# Patient Record
Sex: MALE | Race: WHITE | NOT HISPANIC OR LATINO | Employment: OTHER | URBAN - METROPOLITAN AREA
[De-identification: names, ages, dates, MRNs, and addresses within clinical notes are randomized per-mention and may not be internally consistent; named-entity substitution may affect disease eponyms.]

---

## 2017-02-11 ENCOUNTER — OFFICE VISIT - HEALTHEAST (OUTPATIENT)
Dept: FAMILY MEDICINE | Facility: CLINIC | Age: 47
End: 2017-02-11

## 2017-02-11 DIAGNOSIS — J01.00 ACUTE MAXILLARY SINUSITIS: ICD-10-CM

## 2017-02-11 DIAGNOSIS — H60.93 BILATERAL OTITIS EXTERNA: ICD-10-CM

## 2017-02-21 ENCOUNTER — COMMUNICATION - HEALTHEAST (OUTPATIENT)
Dept: FAMILY MEDICINE | Facility: CLINIC | Age: 47
End: 2017-02-21

## 2017-02-21 ENCOUNTER — AMBULATORY - HEALTHEAST (OUTPATIENT)
Dept: NURSING | Facility: CLINIC | Age: 47
End: 2017-02-21

## 2017-02-22 ENCOUNTER — OFFICE VISIT - HEALTHEAST (OUTPATIENT)
Dept: FAMILY MEDICINE | Facility: CLINIC | Age: 47
End: 2017-02-22

## 2017-02-22 DIAGNOSIS — I10 ESSENTIAL HYPERTENSION: ICD-10-CM

## 2017-02-22 DIAGNOSIS — Z00.00 HEALTH CARE MAINTENANCE: ICD-10-CM

## 2017-02-22 DIAGNOSIS — E66.01 MORBID OBESITY, UNSPECIFIED OBESITY TYPE (H): ICD-10-CM

## 2017-03-08 ENCOUNTER — AMBULATORY - HEALTHEAST (OUTPATIENT)
Dept: FAMILY MEDICINE | Facility: CLINIC | Age: 47
End: 2017-03-08

## 2017-03-08 ENCOUNTER — AMBULATORY - HEALTHEAST (OUTPATIENT)
Dept: LAB | Facility: CLINIC | Age: 47
End: 2017-03-08

## 2017-03-08 ENCOUNTER — AMBULATORY - HEALTHEAST (OUTPATIENT)
Dept: NURSING | Facility: CLINIC | Age: 47
End: 2017-03-08

## 2017-03-08 ENCOUNTER — COMMUNICATION - HEALTHEAST (OUTPATIENT)
Dept: FAMILY MEDICINE | Facility: CLINIC | Age: 47
End: 2017-03-08

## 2017-03-08 DIAGNOSIS — I10 HTN (HYPERTENSION): ICD-10-CM

## 2017-03-08 DIAGNOSIS — Z00.00 HEALTH CARE MAINTENANCE: ICD-10-CM

## 2017-03-08 DIAGNOSIS — I10 ESSENTIAL HYPERTENSION: ICD-10-CM

## 2017-03-08 LAB
CHOLEST SERPL-MCNC: 213 MG/DL
FASTING STATUS PATIENT QL REPORTED: YES
HDLC SERPL-MCNC: 34 MG/DL
LDLC SERPL CALC-MCNC: 137 MG/DL
TRIGL SERPL-MCNC: 209 MG/DL

## 2019-03-15 ENCOUNTER — OFFICE VISIT - HEALTHEAST (OUTPATIENT)
Dept: FAMILY MEDICINE | Facility: CLINIC | Age: 49
End: 2019-03-15

## 2019-03-15 DIAGNOSIS — J40 BRONCHITIS: ICD-10-CM

## 2019-03-20 ENCOUNTER — COMMUNICATION - HEALTHEAST (OUTPATIENT)
Dept: FAMILY MEDICINE | Facility: CLINIC | Age: 49
End: 2019-03-20

## 2019-03-21 ENCOUNTER — COMMUNICATION - HEALTHEAST (OUTPATIENT)
Dept: FAMILY MEDICINE | Facility: CLINIC | Age: 49
End: 2019-03-21

## 2019-03-25 ENCOUNTER — OFFICE VISIT - HEALTHEAST (OUTPATIENT)
Dept: FAMILY MEDICINE | Facility: CLINIC | Age: 49
End: 2019-03-25

## 2019-03-25 DIAGNOSIS — R07.89 CHEST WALL PAIN: ICD-10-CM

## 2019-03-25 DIAGNOSIS — E66.01 MORBID OBESITY (H): ICD-10-CM

## 2019-03-25 DIAGNOSIS — J30.9 ALLERGIC RHINITIS, UNSPECIFIED SEASONALITY, UNSPECIFIED TRIGGER: ICD-10-CM

## 2019-03-25 DIAGNOSIS — R73.03 PREDIABETES: ICD-10-CM

## 2019-03-25 DIAGNOSIS — I10 ESSENTIAL HYPERTENSION: ICD-10-CM

## 2019-03-25 DIAGNOSIS — R74.01 ELEVATED ALT MEASUREMENT: ICD-10-CM

## 2019-03-25 DIAGNOSIS — R07.89 ATYPICAL CHEST PAIN: ICD-10-CM

## 2019-03-25 LAB
ALBUMIN SERPL-MCNC: 4.3 G/DL (ref 3.5–5)
ALP SERPL-CCNC: 81 U/L (ref 45–120)
ALT SERPL W P-5'-P-CCNC: 61 U/L (ref 0–45)
ANION GAP SERPL CALCULATED.3IONS-SCNC: 10 MMOL/L (ref 5–18)
AST SERPL W P-5'-P-CCNC: 32 U/L (ref 0–40)
ATRIAL RATE - MUSE: 87 BPM
BILIRUB SERPL-MCNC: 0.4 MG/DL (ref 0–1)
BUN SERPL-MCNC: 14 MG/DL (ref 8–22)
CALCIUM SERPL-MCNC: 9.7 MG/DL (ref 8.5–10.5)
CHLORIDE BLD-SCNC: 104 MMOL/L (ref 98–107)
CO2 SERPL-SCNC: 25 MMOL/L (ref 22–31)
CREAT SERPL-MCNC: 0.86 MG/DL (ref 0.7–1.3)
DIASTOLIC BLOOD PRESSURE - MUSE: NORMAL MMHG
GFR SERPL CREATININE-BSD FRML MDRD: >60 ML/MIN/1.73M2
GLUCOSE BLD-MCNC: 91 MG/DL (ref 70–125)
HBA1C MFR BLD: 6.3 % (ref 3.5–6)
INTERPRETATION ECG - MUSE: NORMAL
P AXIS - MUSE: 53 DEGREES
POTASSIUM BLD-SCNC: 4.5 MMOL/L (ref 3.5–5)
PR INTERVAL - MUSE: 130 MS
PROT SERPL-MCNC: 7.4 G/DL (ref 6–8)
QRS DURATION - MUSE: 90 MS
QT - MUSE: 368 MS
QTC - MUSE: 442 MS
R AXIS - MUSE: 70 DEGREES
SODIUM SERPL-SCNC: 139 MMOL/L (ref 136–145)
SYSTOLIC BLOOD PRESSURE - MUSE: NORMAL MMHG
T AXIS - MUSE: 36 DEGREES
VENTRICULAR RATE- MUSE: 87 BPM

## 2019-03-25 ASSESSMENT — MIFFLIN-ST. JEOR: SCORE: 2277.4

## 2019-03-26 ENCOUNTER — COMMUNICATION - HEALTHEAST (OUTPATIENT)
Dept: FAMILY MEDICINE | Facility: CLINIC | Age: 49
End: 2019-03-26

## 2019-04-22 ENCOUNTER — COMMUNICATION - HEALTHEAST (OUTPATIENT)
Dept: FAMILY MEDICINE | Facility: CLINIC | Age: 49
End: 2019-04-22

## 2019-08-15 ENCOUNTER — COMMUNICATION - HEALTHEAST (OUTPATIENT)
Dept: FAMILY MEDICINE | Facility: CLINIC | Age: 49
End: 2019-08-15

## 2021-03-02 ENCOUNTER — OFFICE VISIT - HEALTHEAST (OUTPATIENT)
Dept: FAMILY MEDICINE | Facility: CLINIC | Age: 51
End: 2021-03-02

## 2021-03-02 DIAGNOSIS — Z00.00 HEALTH CARE MAINTENANCE: ICD-10-CM

## 2021-03-02 DIAGNOSIS — I10 ESSENTIAL HYPERTENSION: ICD-10-CM

## 2021-03-04 ENCOUNTER — COMMUNICATION - HEALTHEAST (OUTPATIENT)
Dept: FAMILY MEDICINE | Facility: CLINIC | Age: 51
End: 2021-03-04

## 2021-03-04 ENCOUNTER — OFFICE VISIT - HEALTHEAST (OUTPATIENT)
Dept: FAMILY MEDICINE | Facility: CLINIC | Age: 51
End: 2021-03-04

## 2021-03-04 DIAGNOSIS — I10 ESSENTIAL HYPERTENSION: ICD-10-CM

## 2021-03-04 DIAGNOSIS — E11.9 TYPE 2 DIABETES MELLITUS WITHOUT COMPLICATION, WITHOUT LONG-TERM CURRENT USE OF INSULIN (H): ICD-10-CM

## 2021-03-04 DIAGNOSIS — R06.01 ORTHOPNEA: ICD-10-CM

## 2021-03-04 DIAGNOSIS — E66.813 CLASS 3 SEVERE OBESITY DUE TO EXCESS CALORIES WITHOUT SERIOUS COMORBIDITY WITH BODY MASS INDEX (BMI) OF 45.0 TO 49.9 IN ADULT (H): ICD-10-CM

## 2021-03-04 DIAGNOSIS — Z13.220 LIPID SCREENING: ICD-10-CM

## 2021-03-04 DIAGNOSIS — E66.01 CLASS 3 SEVERE OBESITY DUE TO EXCESS CALORIES WITHOUT SERIOUS COMORBIDITY WITH BODY MASS INDEX (BMI) OF 45.0 TO 49.9 IN ADULT (H): ICD-10-CM

## 2021-03-04 DIAGNOSIS — Z00.00 HEALTH CARE MAINTENANCE: ICD-10-CM

## 2021-03-04 LAB
ALBUMIN SERPL-MCNC: 4.1 G/DL (ref 3.5–5)
ALP SERPL-CCNC: 103 U/L (ref 45–120)
ALT SERPL W P-5'-P-CCNC: 41 U/L (ref 0–45)
ANION GAP SERPL CALCULATED.3IONS-SCNC: 11 MMOL/L (ref 5–18)
AST SERPL W P-5'-P-CCNC: 21 U/L (ref 0–40)
BILIRUB SERPL-MCNC: 0.5 MG/DL (ref 0–1)
BNP SERPL-MCNC: <10 PG/ML (ref 0–39)
BUN SERPL-MCNC: 14 MG/DL (ref 8–22)
CALCIUM SERPL-MCNC: 9 MG/DL (ref 8.5–10.5)
CHLORIDE BLD-SCNC: 101 MMOL/L (ref 98–107)
CHOLEST SERPL-MCNC: 228 MG/DL
CO2 SERPL-SCNC: 27 MMOL/L (ref 22–31)
CREAT SERPL-MCNC: 0.81 MG/DL (ref 0.7–1.3)
ERYTHROCYTE [DISTWIDTH] IN BLOOD BY AUTOMATED COUNT: 12.1 % (ref 11–14.5)
FASTING STATUS PATIENT QL REPORTED: YES
GFR SERPL CREATININE-BSD FRML MDRD: >60 ML/MIN/1.73M2
GLUCOSE BLD-MCNC: 139 MG/DL (ref 70–125)
HBA1C MFR BLD: 7.4 %
HCT VFR BLD AUTO: 47 % (ref 40–54)
HDLC SERPL-MCNC: 38 MG/DL
HGB BLD-MCNC: 15.9 G/DL (ref 14–18)
LDLC SERPL CALC-MCNC: 132 MG/DL
MCH RBC QN AUTO: 31.4 PG (ref 27–34)
MCHC RBC AUTO-ENTMCNC: 33.8 G/DL (ref 32–36)
MCV RBC AUTO: 93 FL (ref 80–100)
PLATELET # BLD AUTO: 232 THOU/UL (ref 140–440)
PMV BLD AUTO: 11.9 FL (ref 7–10)
POTASSIUM BLD-SCNC: 4.7 MMOL/L (ref 3.5–5)
PROT SERPL-MCNC: 7.2 G/DL (ref 6–8)
PSA SERPL-MCNC: 0.5 NG/ML (ref 0–2.5)
RBC # BLD AUTO: 5.07 MILL/UL (ref 4.4–6.2)
SODIUM SERPL-SCNC: 139 MMOL/L (ref 136–145)
TRIGL SERPL-MCNC: 291 MG/DL
WBC: 9.5 THOU/UL (ref 4–11)

## 2021-03-04 ASSESSMENT — MIFFLIN-ST. JEOR: SCORE: 2358.58

## 2021-03-05 RX ORDER — ATORVASTATIN CALCIUM 20 MG/1
20 TABLET, FILM COATED ORAL DAILY
Qty: 90 TABLET | Refills: 3 | Status: SHIPPED | OUTPATIENT
Start: 2021-03-05 | End: 2023-06-22

## 2021-04-05 ENCOUNTER — OFFICE VISIT - HEALTHEAST (OUTPATIENT)
Dept: FAMILY MEDICINE | Facility: CLINIC | Age: 51
End: 2021-04-05

## 2021-04-05 DIAGNOSIS — E66.01 CLASS 3 SEVERE OBESITY DUE TO EXCESS CALORIES WITHOUT SERIOUS COMORBIDITY WITH BODY MASS INDEX (BMI) OF 40.0 TO 44.9 IN ADULT (H): ICD-10-CM

## 2021-04-05 DIAGNOSIS — I10 ESSENTIAL HYPERTENSION: ICD-10-CM

## 2021-04-05 DIAGNOSIS — E66.813 CLASS 3 SEVERE OBESITY DUE TO EXCESS CALORIES WITHOUT SERIOUS COMORBIDITY WITH BODY MASS INDEX (BMI) OF 40.0 TO 44.9 IN ADULT (H): ICD-10-CM

## 2021-04-05 DIAGNOSIS — R80.9 TYPE 2 DIABETES MELLITUS WITH MICROALBUMINURIA, WITHOUT LONG-TERM CURRENT USE OF INSULIN (H): ICD-10-CM

## 2021-04-05 DIAGNOSIS — E11.29 TYPE 2 DIABETES MELLITUS WITH MICROALBUMINURIA, WITHOUT LONG-TERM CURRENT USE OF INSULIN (H): ICD-10-CM

## 2021-04-05 LAB
ANION GAP SERPL CALCULATED.3IONS-SCNC: 10 MMOL/L (ref 5–18)
BUN SERPL-MCNC: 11 MG/DL (ref 8–22)
CALCIUM SERPL-MCNC: 8.5 MG/DL (ref 8.5–10.5)
CHLORIDE BLD-SCNC: 104 MMOL/L (ref 98–107)
CO2 SERPL-SCNC: 24 MMOL/L (ref 22–31)
CREAT SERPL-MCNC: 0.76 MG/DL (ref 0.7–1.3)
CREAT UR-MCNC: 138.4 MG/DL
GFR SERPL CREATININE-BSD FRML MDRD: >60 ML/MIN/1.73M2
GLUCOSE BLD-MCNC: 122 MG/DL (ref 70–125)
MICROALBUMIN UR-MCNC: 6.83 MG/DL (ref 0–1.99)
MICROALBUMIN/CREAT UR: 49.3 MG/G
POTASSIUM BLD-SCNC: 4.4 MMOL/L (ref 3.5–5)
SODIUM SERPL-SCNC: 138 MMOL/L (ref 136–145)

## 2021-04-05 ASSESSMENT — MIFFLIN-ST. JEOR: SCORE: 2281.47

## 2021-04-27 ENCOUNTER — COMMUNICATION - HEALTHEAST (OUTPATIENT)
Dept: FAMILY MEDICINE | Facility: CLINIC | Age: 51
End: 2021-04-27

## 2021-04-27 DIAGNOSIS — I10 ESSENTIAL HYPERTENSION: ICD-10-CM

## 2021-05-01 ENCOUNTER — COMMUNICATION - HEALTHEAST (OUTPATIENT)
Dept: FAMILY MEDICINE | Facility: CLINIC | Age: 51
End: 2021-05-01

## 2021-05-01 DIAGNOSIS — I10 ESSENTIAL HYPERTENSION: ICD-10-CM

## 2021-05-03 ENCOUNTER — RECORDS - HEALTHEAST (OUTPATIENT)
Dept: FAMILY MEDICINE | Facility: CLINIC | Age: 51
End: 2021-05-03

## 2021-05-03 DIAGNOSIS — I10 ESSENTIAL HYPERTENSION: ICD-10-CM

## 2021-05-03 RX ORDER — LOSARTAN POTASSIUM 25 MG/1
25 TABLET ORAL DAILY
Qty: 90 TABLET | Refills: 1 | Status: SHIPPED | OUTPATIENT
Start: 2021-05-03 | End: 2022-01-18

## 2021-05-20 ENCOUNTER — COMMUNICATION - HEALTHEAST (OUTPATIENT)
Dept: FAMILY MEDICINE | Facility: CLINIC | Age: 51
End: 2021-05-20

## 2021-05-20 DIAGNOSIS — R80.9 TYPE 2 DIABETES MELLITUS WITH MICROALBUMINURIA, WITHOUT LONG-TERM CURRENT USE OF INSULIN (H): ICD-10-CM

## 2021-05-20 DIAGNOSIS — E11.29 TYPE 2 DIABETES MELLITUS WITH MICROALBUMINURIA, WITHOUT LONG-TERM CURRENT USE OF INSULIN (H): ICD-10-CM

## 2021-05-27 NOTE — PATIENT INSTRUCTIONS - HE
BP Readings from Last 7 Encounters:   03/25/19 (!) 162/112   03/15/19 (!) 157/103   03/08/17 (!) 120/94   02/22/17 132/88   02/21/17 (!) 150/110   02/11/17 142/80   10/06/16 (!) 148/96     Start taking your blood pressure medication.  I am starting you back on a combination of losartan and hydrochlorothiazide.  Work on losing weight.    For phlegm and seasonal allergy, start using Flonase and Claritin daily until you feel your nasal congestion is improving.    Wt Readings from Last 7 Encounters:   03/25/19 (!) 310 lb (140.6 kg)   03/15/19 (!) 310 lb 8 oz (140.8 kg)   02/22/17 (!) 281 lb (127.5 kg)   02/11/17 (!) 283 lb 4.8 oz (128.5 kg)   10/06/16 (!) 272 lb (123.4 kg)   10/03/16 (!) 273 lb 0.6 oz (123.9 kg)   03/05/16 (!) 277 lb 1.6 oz (125.7 kg)

## 2021-05-27 NOTE — TELEPHONE ENCOUNTER
Reason contacted:  Test results  Information relayed:  Informed Pt of Dr Moody's message and recommendations. Pt states understanding and will attempt dietary modification and exercise. Will schedule a visit for 3 months. Will mail copy of results to  address.  Additional questions:  No  Further follow-up needed:  No  Okay to leave a detailed message:  No

## 2021-05-27 NOTE — PROGRESS NOTES
Assessment:     1. Atypical chest pain  Electrocardiogram Perform and Read    cyclobenzaprine (FLEXERIL) 5 MG tablet   2. Essential hypertension  Comprehensive Metabolic Panel    Glycosylated Hemoglobin A1c    losartan-hydrochlorothiazide (HYZAAR) 50-12.5 mg per tablet   3. Morbid obesity (H)  Comprehensive Metabolic Panel    Glycosylated Hemoglobin A1c   4. Chest wall pain     5. Allergic rhinitis, unspecified seasonality, unspecified trigger  fluticasone (FLONASE) 50 mcg/actuation nasal spray    loratadine (CLARITIN) 10 mg tablet   6. Elevated ALT measurement     7. Prediabetes     X-ray done at walk-in clinic is reviewed and is normal.  Reviewed visit at walk-in clinic although the note is still incomplete.  However noted that he was treated with antibiotic and a prednisone pack and albuterol.    EKG read by me does not show any acute changes.     Since patient is not on any medication for hypertension, I emphasized that he needs to take and we will start with a combination pill.  He is agreeable baseline labs done today.    His chest pain appears to be chest wall pain and we will treat with muscle relaxer.  Warning signs and symptoms discussed with her.    DATA REVIEWED:  Additional History from Old Records Summarized (2): 2  Decision to Obtain Records (1): 0  Radiology Tests Summarized or Ordered (1): 1  Labs Reviewed or Ordered (1): 1  Medicine Test Summarized or Ordered (1): 0  Independent Review of EKG or X-RAY(2 each):2       Plan:      The diagnosis was discussed with the patient and evaluation and treatment plans outlined.  See orders for lab and imaging studies.     Start taking your blood pressure medication.  I am starting you back on a combination of losartan and hydrochlorothiazide.  Work on losing weight.    For phlegm and seasonal allergy, start using Flonase and Claritin daily until you feel your nasal congestion is improving.    Subjective:      Peter Arora is a  male who presents for  evaluation of   Chief Complaint   Patient presents with     Chest Pain     X-ray done 3/15/2019, problems with breathing and flem in the back of the throat, non stop coughing, now having pain on the left side of the ribs through the back.      Onset was 1 month ago. Symptoms have been stable.   He describes that he has always had allergy.    He describes that he is always had wheezing off and on.  He does not take any medications.  He does not like any allergy medication.    Over the last 1 month his cough has been so bad that his left side of the lower chest is hurting.    He describes that he is going on a fishing trip tomorrow and would like something for pain control.      He was seen at the urgent care and x-ray was done.    X-rays reviewed and this is normal.  He was dispensed with albuterol inhaler that he has been using as needed.  With some relief.    He was also treated with antibiotic and prednisone burst.    The pain is described as sharp and shooting, and is 8/10 in intensity. Pain is located in the right lower ribs with radiation to back.  Aggravating factors: activity and coughing.  Alleviating factors: recumbency. Associated symptoms: none. The patient denies anorexia, arthralagias, fever and vomiting.    He has essential hypertension and is on 3 medication.  However, he informs me that he does not take any medications as he does not feel the need for it.  He has no symptoms related to his blood pressure.  He denies shortness of breath, palpitations, left-sided chest pain or Declining energy or swelling of the lower extremities.  The following portions of the patient's history were reviewed and updated as appropriate: allergies, current medications, past family history, past medical history, past social history, past surgical history and problem list.  Allergies   Allergen Reactions     Lisinopril      Cough         Current Outpatient Medications on File Prior to Visit   Medication Sig Dispense  Refill     albuterol (PROAIR HFA;PROVENTIL HFA;VENTOLIN HFA) 90 mcg/actuation inhaler Inhale 2 puffs every 4 (four) hours as needed for wheezing or shortness of breath. 1 each 0     No current facility-administered medications on file prior to visit.        Patient Active Problem List   Diagnosis     Obesity     Abnormal Weight Gain     Peripheral Neuropathy     Hoarseness     Cough     Benign Essential Hypertension     Carpal Tunnel Syndrome     Allergies     Morbid obesity (H)     Prediabetes     Elevated ALT measurement       Past Medical History:   Diagnosis Date     Allergies     Created by Conversion      Benign Essential Hypertension     Created by Conversion      Carpal tunnel syndrome     Created by Conversion      Obesity     Created by Conversion      Peripheral Neuropathy     Created by Conversion  Replacement Utility updated for latest IMO load       History reviewed. No pertinent surgical history.    Family History   Problem Relation Age of Onset     Diabetes Brother        Social History     Socioeconomic History     Marital status: Single     Spouse name: None     Number of children: None     Years of education: None     Highest education level: None   Occupational History     None   Social Needs     Financial resource strain: None     Food insecurity:     Worry: None     Inability: None     Transportation needs:     Medical: None     Non-medical: None   Tobacco Use     Smoking status: Never Smoker     Smokeless tobacco: Never Used   Substance and Sexual Activity     Alcohol use: None     Drug use: None     Sexual activity: None   Lifestyle     Physical activity:     Days per week: None     Minutes per session: None     Stress: None   Relationships     Social connections:     Talks on phone: None     Gets together: None     Attends Synagogue service: None     Active member of club or organization: None     Attends meetings of clubs or organizations: None     Relationship status: None     Intimate  "partner violence:     Fear of current or ex partner: None     Emotionally abused: None     Physically abused: None     Forced sexual activity: None   Other Topics Concern     None   Social History Narrative     None       Review of Systems  A 12 point comprehensive review of systems was negative except as noted.       Objective:   BP (!) 138/101 (Patient Site: Left Arm, Patient Position: Sitting, Cuff Size: Adult Large)   Pulse 95   Resp 24   Ht 5' 10\" (1.778 m)   Wt (!) 310 lb (140.6 kg)   SpO2 95%   BMI 44.48 kg/m      General Appearance: healthy, alert, oriented and no distress  HEENT Exam: Oropharynx clear without lesion or exudate.  Nasal turbinates are erythematous and hypertrophied.  Mild oropharyngeal erythema and postnasal drip seen  Neck:  supple, no adenopathy, thyroid normal  Heart: Normal heart sounds, S1 and S2, No murmurs.  Lungs: Normal breath sounds, good air entry with intermittent wheezing noted.  Skin exam: No visible or palpable abnormalities  Neurological exam: gait normal, alert and oriented X 3  Hem/Lymph/Immuno exam: negative findings:  no cervical nodes, no supraclavicular nodes, no axillary nodes      "

## 2021-05-27 NOTE — TELEPHONE ENCOUNTER
----- Message from Thania Moody MD sent at 3/25/2019  3:38 PM CDT -----  Please inform patient that the test for screening diabetes is borderline.  Hemoglobin A1c is 6.3.  This indicates prediabetes (hemoglobin A1c between 5.7 and 6.4 is prediabetes and 6.5 or greater is Diabetes).  I recommend dietary modification and exercise and a repeat check in 3 months time.  A nutritional consult can also be considered.  If no improvement then we can consider starting on some medications.  Thania Moody MD  3/25/2019  P.S-I have sent  my chart message.

## 2021-05-30 VITALS — BODY MASS INDEX: 40.65 KG/M2 | WEIGHT: 283.3 LBS

## 2021-05-30 VITALS — WEIGHT: 281 LBS | BODY MASS INDEX: 40.32 KG/M2

## 2021-06-01 ENCOUNTER — RECORDS - HEALTHEAST (OUTPATIENT)
Dept: ADMINISTRATIVE | Facility: CLINIC | Age: 51
End: 2021-06-01

## 2021-06-02 VITALS — WEIGHT: 310.5 LBS | BODY MASS INDEX: 44.55 KG/M2

## 2021-06-02 VITALS — HEIGHT: 70 IN | BODY MASS INDEX: 44.38 KG/M2 | WEIGHT: 310 LBS

## 2021-06-03 ENCOUNTER — RECORDS - HEALTHEAST (OUTPATIENT)
Dept: ADMINISTRATIVE | Facility: CLINIC | Age: 51
End: 2021-06-03

## 2021-06-05 VITALS
HEART RATE: 86 BPM | SYSTOLIC BLOOD PRESSURE: 157 MMHG | DIASTOLIC BLOOD PRESSURE: 104 MMHG | OXYGEN SATURATION: 98 % | HEIGHT: 70 IN | BODY MASS INDEX: 45.1 KG/M2 | WEIGHT: 315 LBS | TEMPERATURE: 97.5 F

## 2021-06-05 VITALS
TEMPERATURE: 97.3 F | BODY MASS INDEX: 44.67 KG/M2 | HEART RATE: 73 BPM | HEIGHT: 70 IN | WEIGHT: 312 LBS | DIASTOLIC BLOOD PRESSURE: 90 MMHG | SYSTOLIC BLOOD PRESSURE: 141 MMHG

## 2021-06-07 ENCOUNTER — COMMUNICATION - HEALTHEAST (OUTPATIENT)
Dept: FAMILY MEDICINE | Facility: CLINIC | Age: 51
End: 2021-06-07

## 2021-06-07 DIAGNOSIS — R80.9 TYPE 2 DIABETES MELLITUS WITH MICROALBUMINURIA, WITHOUT LONG-TERM CURRENT USE OF INSULIN (H): ICD-10-CM

## 2021-06-07 DIAGNOSIS — E11.29 TYPE 2 DIABETES MELLITUS WITH MICROALBUMINURIA, WITHOUT LONG-TERM CURRENT USE OF INSULIN (H): ICD-10-CM

## 2021-06-08 ENCOUNTER — COMMUNICATION - HEALTHEAST (OUTPATIENT)
Dept: FAMILY MEDICINE | Facility: CLINIC | Age: 51
End: 2021-06-08

## 2021-06-08 DIAGNOSIS — E11.29 TYPE 2 DIABETES MELLITUS WITH MICROALBUMINURIA, WITHOUT LONG-TERM CURRENT USE OF INSULIN (H): ICD-10-CM

## 2021-06-08 DIAGNOSIS — R80.9 TYPE 2 DIABETES MELLITUS WITH MICROALBUMINURIA, WITHOUT LONG-TERM CURRENT USE OF INSULIN (H): ICD-10-CM

## 2021-06-08 NOTE — PROGRESS NOTES
Chief Complaint   Patient presents with     Facial Pain     teeth pain, ringing in ears, cough, pressure. Started 1 wk ago, getting worse, not sleeping.        HPI:    Patient is here for over a week of green, thick nasal discharge with congestion, and facial pain, with a mild cough, and bilateral ear pain. No fever, chills, chest pain, shortness of breath. He took OTC meds without relief.     ROS: Pertinent ROS noted in HPI.     Allergies   Allergen Reactions     Lisinopril      Cough         Patient Active Problem List   Diagnosis     Obesity     Abnormal Weight Gain     Peripheral Neuropathy     Hoarseness     Cough     Benign Essential Hypertension     Carpal Tunnel Syndrome     Allergies       No family history on file.    Social History     Social History     Marital status: Single     Spouse name: N/A     Number of children: N/A     Years of education: N/A     Occupational History     Not on file.     Social History Main Topics     Smoking status: Never Smoker     Smokeless tobacco: Never Used     Alcohol use Not on file     Drug use: Not on file     Sexual activity: Not on file     Other Topics Concern     Not on file     Social History Narrative     Objective:    Vitals:    02/11/17 1137   BP: 142/80   Pulse: (!) 105   Resp: 15   Temp: 98.3  F (36.8  C)   SpO2: 96%       Gen:NAD  Throat: normal  Ears: normal TMs bilaterally, ear canals erythematous and inflamed bilaterally with tenderness upon manipulation of bilateral external ears  Nose: congested and inflamed nasal mucosa bilaterally  Sinus: tenderness to percussion of maxillary sinuses bilaterally  Neck: no adenopathy  CV: RRR, no M, R, G  Pulm: CTAB, normal effort      Acute maxillary sinusitis  -     amoxicillin (AMOXIL) 875 MG tablet; Take 1 tablet (875 mg total) by mouth 2 (two) times a day for 10 days.    Bilateral otitis externa  -     ofloxacin (FLOXIN) 0.3 % otic solution; Administer 10 drops once daily to each ear for 7 days.      Supportive  cares, and follow up as directed.

## 2021-06-09 RX ORDER — METFORMIN HCL 500 MG
TABLET, EXTENDED RELEASE 24 HR ORAL
Qty: 360 TABLET | Refills: 1 | Status: SHIPPED | OUTPATIENT
Start: 2021-06-09 | End: 2021-12-22

## 2021-06-09 NOTE — PROGRESS NOTES
Pt here for nurse BP check. Pt stated he has been taking the HCTZ and has cut sodium out of his diet. Pt stated he has been feeling better and no longer having dizziness.        BP:128/88 P:88  Recheck BP: 120/94    See BP and OV notes below from 2/22/17    BP: 134/88  Recheck: 132/88    ASSESSMENT & PLAN:     1. 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 Dispense: 90 tablet; Refill: 0  - Basic Metabolic Panel; Future  - Lipid [place] FASTING; Future  - HM2(CBC w/o Differential); Future

## 2021-06-09 NOTE — PROGRESS NOTES
ASSESSMENT & PLAN:    1. 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  Dispense: 90 tablet; Refill: 0  - Basic Metabolic Panel; Future  - Lipid [place] FASTING; Future  - HM2(CBC w/o Differential); Future    2. Health care maintenance  We will return in the near future fasting labs  Discussed with him the importance of checking these on a regular basis since he is on medications  Have not seen the patient roughly 2 years and we need to be seen on a more frequent basis  - Basic Metabolic Panel; Future  - Lipid [place] FASTING; Future  - HM2(CBC w/o Differential); Future    3. Morbid obesity, unspecified obesity type  Patient is aware there is been gaining weight and needs to lose weight  BMI is not calculated him in morbid obesity range  He is going to try to start eating better and we discussed the need for more cardiovascular exercise      There are no Patient Instructions on file for this visit.    Orders Placed This Encounter   Procedures     Basic Metabolic Panel     Standing Status:   Future     Standing Expiration Date:   2/22/2018     Lipid [place] FASTING     Standing Status:   Future     Standing Expiration Date:   2/22/2018     Order Specific Question:   Fasting is required?     Answer:   Yes     HM2(CBC w/o Differential)     Standing Status:   Future     Standing Expiration Date:   2/22/2018     Medications Discontinued During  This Encounter   Medication Reason     hydrochlorothiazide (HYDRODIURIL) 25 MG tablet Therapy completed       No Follow-up on file.    CHIEF COMPLAINT:  Chief Complaint   Patient presents with     Hypertension     Medication check        HISTORY OF PRESENT ILLNESS:  Peter is a 46 y.o. male presenting to the clinic today for a medication check. His blood pressure was elevated at his recent DOT physical, and he did not pass. He has put on some weight and is afraid that his blood pressure has been worse as a result. He continues to take 100 mg of losartan daily, but he does not recall when he discontinued hydrochlorothiazide. He developed a cough from lisinopril. He takes all of his medications at night. He tries to watch his sodium intake, but it was discussed that eating out does not allow for this.     REVIEW OF SYSTEMS:   He has gained some weight since the last time he was seen. He has not done any lab work in a while. He is recently getting over a cold. All other systems are negative.    PFSH:  He drives semi for work. He rebuilt his house, but he has not had a kitchen for six months, so he has been eating a lot of fast food. He does not smoke or drink alcohol. He used to drink a half gallon of milk daily, but he has discontinued this habit.     TOBACCO USE:  History   Smoking Status     Never Smoker   Smokeless Tobacco     Never Used       VITALS:  Vitals:    02/22/17 1351 02/22/17 1426   BP: 134/88 132/88   Patient Site: Right Arm    Patient Position: Sitting    Cuff Size: Adult Large    Pulse: 90    Resp: 18    Temp: 98.7  F (37.1  C)    TempSrc: Oral    Weight: (!) 281 lb (127.5 kg)      Wt Readings from Last 3 Encounters:   02/22/17 (!) 281 lb (127.5 kg)   02/11/17 (!) 283 lb 4.8 oz (128.5 kg)   10/06/16 (!) 272 lb (123.4 kg)       QUALITY MEASURES:  The following high BMI interventions were performed this visit: encouragement to exercise and weight monitoring    PHYSICAL EXAM:  GENERAL APPEARANCE: Alert,  cooperative, no distress, appears stated age   REPEAT BP BY MD: 132/88 RUE, sitting.   LUNGS: Clear to auscultation bilaterally, respirations unlabored .  HEART: Regular rate and rhythm, S1 and S2 normal, no murmur, rub, or gallop. No lower extremity edema.   Abd: obese  NEUROLOGIC: No gross focal deficits  PSYCHOLOGIC: Normal mood and affect      ADDITIONAL HISTORY SUMMARIZED (FROM OLD RECORDS OR HISTORY FROM SOMEONE OTHER THAN THE PATIENT OR ANOTHER HEALTHCARE PROVIDER) (2 TOTAL): Reviewed own March 2015 note regarding medications. Reviewed notes from previous DOT exam    DECISION TO OBTAIN EXTRA INFORMATION (OLD RECORDS REQUESTED OR HISTORY FROM ANOTHER PERSON OR ACCESSING CARE EVERYWHERE) (1 TOTAL): None.     RADIOLOGY TESTS SUMMARIZED OR ORDERED (XRAY/CT/MRI/DXA) (1 TOTAL): None.    LABS REVIEWED OR ORDERED (1 TOTAL): 2013 labs reviewed. Labs ordered.     MEDICINE TESTS SUMMARIZED OR ORDERED (EKG/ECHO) (1 TOTAL): None.    INDEPENDENT REVIEW OF EKG OR X-RAY (2 EACH): None.     The visit lasted a total of 14 minutes face to face with the patient. Over 50% of the time was spent counseling and educating the patient about his medications.    I, Kirk Cowan, am scribing for and in the presence of, Dr. Duron.    I, Dr. Duron, personally performed the services described in this documentation, as scribed by Kirk Cowan in my presence, and it is both accurate and complete.    MEDICATIONS:  Current Outpatient Prescriptions   Medication Sig Dispense Refill     losartan (COZAAR) 50 MG tablet Take 2 tablets (100 mg total) by mouth daily. At bedtime 180 tablet 2     hydroCHLOROthiazide (HYDRODIURIL) 25 MG tablet Take 1 tablet (25 mg total) by mouth daily. 90 tablet 0     No current facility-administered medications for this visit.        Total Data Points: 3

## 2021-06-09 NOTE — PROGRESS NOTES
BP improved but still not at goal- will add 3rd medication called amlodipine to his regimen- please have him start this and f/u with nurse BP check in 10 days.  Dr. Duron

## 2021-06-09 NOTE — PROGRESS NOTES
Pt came in for BP check today.      BP's were 144/104 & 146/110    Advised f/u with PCP to get BP under control & helped schedule with PCP.  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    Pt understood these instructions - will send encounter to PCP.  Closing encounter

## 2021-06-09 NOTE — PROGRESS NOTES
Patient notified and he will  his rx at his pharmacy. He did have some concerns regarding side effects and how common it is for people to be on 3 different blood pressure medications at the same time.  I advised him that he can check with the pharmacist on all side effects of the medication. Please advise if you can about being on 3 medications.

## 2021-06-15 NOTE — TELEPHONE ENCOUNTER
RN cannot approve Refill Request    RN can NOT refill this medication Protocol failed and NO refill given. Last office visit: 3/4/2021 Alma Delia Miranda DO Last Physical: Visit date not found Last MTM visit: Visit date not found Last visit same specialty: 3/4/2021 Alma Delia Miranda DO.  Next visit within 3 mo: Visit date not found  Next physical within 3 mo: Visit date not found      Jacoby Heath, [facility] Triage/Med Refill 3/5/2021    Requested Prescriptions   Pending Prescriptions Disp Refills     losartan (COZAAR) 25 MG tablet [Pharmacy Med Name: LOSARTAN 25MG TABLETS] 90 tablet 0     Sig: TAKE 1 TABLET(25 MG) BY MOUTH DAILY       Angiotensin Receptor Blocker Protocol Failed - 3/4/2021 10:43 AM        Failed - Serum potassium within the past 12 months     Lab Results   Component Value Date    Potassium 4.7 03/04/2021             Failed - Serum creatinine within the past 12 months     Creatinine   Date Value Ref Range Status   03/04/2021 0.81 0.70 - 1.30 mg/dL Final             Passed - PCP or prescribing provider visit in past 12 months       Last office visit with prescriber/PCP: 3/4/2021 Alma Delia Miranda DO OR same dept: 3/4/2021 Alma Delia Miranda DO OR same specialty: 3/4/2021 Alma Delia Miranda DO  Last physical: Visit date not found Last MTM visit: Visit date not found   Next visit within 3 mo: Visit date not found  Next physical within 3 mo: Visit date not found  Prescriber OR PCP: Alma Delia Miranda DO  Last diagnosis associated with med order: 1. Essential hypertension  - losartan (COZAAR) 25 MG tablet [Pharmacy Med Name: LOSARTAN 25MG TABLETS]; TAKE 1 TABLET(25 MG) BY MOUTH DAILY  Dispense: 90 tablet; Refill: 0    If protocol passes may refill for 12 months if within 3 months of last provider visit (or a total of 15 months).             Passed - Blood pressure filed in past 12 months     BP Readings from Last 1 Encounters:   03/04/21 (!) 157/104

## 2021-06-15 NOTE — PROGRESS NOTES
Assessment/plan   1. Orthopnea  Worsening respiratory difficulties with concerns of fluid overload based on history.  Vitally stable today with an oxygen saturation of 98% and clear lung fields so do not feel he is in acute heart failure.  BNP pending.  He likely has underlying EFRAÍN as well so will obtain sleep study.  Again, reviewed the importance of weight loss and managing comorbid conditions to improve his symptoms.  - Ambulatory referral to Sleep Medicine  - BNP(B-type Natriuretic Peptide)    2. Essential hypertension  Blood pressure elevated today.  He stopped his blood pressure medications 2 years ago. Losartan prescribed today, since he had a cough with lisinopril.  Follow-up in 4 weeks for reassessment and possible dose adjustment.  - Comprehensive Metabolic Panel  - losartan (COZAAR) 25 MG tablet; Take 1 tablet (25 mg total) by mouth daily.  Dispense: 30 tablet; Refill: 1    3. Class 3 severe obesity due to excess calories without serious comorbidity with body mass index (BMI) of 45.0 to 49.9 in adult (H)  Body mass index is 47.21 kg/m . Discussed importance of weight loss through healthy diet and exercise.  Patient will let us know if they are interested in meeting with one of our weight loss specialist, but he currently declines.  He will work on portion control, limiting carbs and increasing his vegetables.  Goal to lose 5 pounds next time we see each other in 4 weeks.  He will also try to incorporate 30 minutes of walking into each day.  We will continue to consider weight loss surgery.    4. Type 2 diabetes mellitus without complication, without long-term current use of insulin (H)  Meets diabetic criteria today, this is a new diagnosis.  Will recommend Metformin initiation with gradual taper to max dosing.  We will also need to discuss initiating statin therapy and assess ASCVD risk for aspirin recommendation.  - Glycosylated Hemoglobin A1c    5. Lipid screening  - Lipid Pratt FASTING    6. Health  care maintenance  - HM2(CBC w/o Differential)  - PSA, Annual Screen (Prostatic-Specific Antigen)      Follow up: 4 weeks    Alma Delia Miranda DO    83 minutes spent on the date of the encounter doing chart review, history and exam, documentation and further activities as noted above    Options for treatment and follow-up care were reviewed with the patient. Peter Arora engaged in the decision making process and verbalized understanding of the options discussed and agreed with the final plan.    This note has been dictated using voice recognition software. Any grammatical or context distortions are unintentional and inherent to the software.      Subjective:      HPI: Peter Arora is a 50 y.o. male who is here for:    Chief Complaint   Patient presents with     Hypertension     has not taken meds in a couple years     Breathing Problem     coughing and sob has gotten worse in the past few years, some sleep apnea concerns     Eye Problem     left cataract surgery as a kid, eyesight has always been bad in that eye     For the past few months he has had increased respiratory difficulties.  He notes particularly when lying flat on his back.  Right now it is not too bad that he does have bouts of uncontrolled difficulty breathing and wheezing.  He feels it is related to his weight gain.  He states he is gained approximately 100 pounds in the past 10 years.  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  He describes his breathing as though at times he feels he is drowning.  He also has coughing fits.  He does not smoke.  He does admit to snoring loudly and often wakes up gasping for air.  He occasionally has aspiration symptoms as of water goes down the wrong tube.  He has noticed increased swelling in his legs.  He has not been on blood pressure medicine since approximately 2019 out of laziness.  He states once in a while he will get a sharp chest pain but nothing  "consistent.    He has been told he has prediabetes in the past.  He states most recently he has been getting up about 3 times per night to urinate.  He does admit to drinking large amounts of water.  He does not drink soda and very minimal alcohol.  He understands he needs to lose weight but states has been difficult.  He previously had cut out carbs and lost a significant amount of weight but has since gained it back.  He states his father did undergo weight loss surgery many years ago and has been doing well.  He states his health is asked better than him.  Weight loss surgery has been in the back of his mind but he is unsure what it all entails.  He is not interested in meeting with a dietitian.  He does not exercise.  His job is mostly sedentary.  He has a treadmill at home but he does not use this.    Objective:    BP (!) 157/104   Pulse 86   Temp 97.5  F (36.4  C) (Oral)   Ht 5' 10\" (1.778 m)   Wt (!) 329 lb (149.2 kg)   SpO2 98%   BMI 47.21 kg/m      Physical Exam:   General: Alert, pleasant, cooperative, NAD  HEENT: NC/AT, EOMI, neck supple, no lymphadenopathy  CV: RRR, no murmurs, rubs or gallops  Respiratory: normal effort, lungs CTAB with good aeration throughout  Extremities: warm, 1+ bilateral edema to mid shin right greater than left  Psych: mood neutral and affect appropriate    Recent Results (from the past 24 hour(s))   Glycosylated Hemoglobin A1c   Result Value Ref Range    Hemoglobin A1c 7.4 (H) <=5.6 %   HM2(CBC w/o Differential)   Result Value Ref Range    WBC 9.5 4.0 - 11.0 thou/uL    RBC 5.07 4.40 - 6.20 mill/uL    Hemoglobin 15.9 14.0 - 18.0 g/dL    Hematocrit 47.0 40.0 - 54.0 %    MCV 93 80 - 100 fL    MCH 31.4 27.0 - 34.0 pg    MCHC 33.8 32.0 - 36.0 g/dL    RDW 12.1 11.0 - 14.5 %    Platelets 232 140 - 440 thou/uL    MPV 11.9 (H) 7.0 - 10.0 fL         "

## 2021-06-15 NOTE — PATIENT INSTRUCTIONS - HE
-Blood work today  -Start lisinopril for blood pressure, with follow up in 4 weeks  -Sleep study for evaluate for sleep apnea  -Work on weight loss, ideally down at least 5 pounds the next time I see you. We can continue to consider weight loss surgery. -PORTION CONTROL!   -No more than 4000 mg of sodium per day  -Consider compression stockings    Weight Loss Basics    Remember to:    -Eat 3 meals a day (not 2, not 5) Chew your food well/SLOW down  -Eat your protein first  -Be a water drinker/Minize liquid calories (no regular pop, no juice) skim or 1% milk OK  -Sleep 7-8 hours each night. Address sleep if problematic  -Stress management is important. Address if problematic  -Move-8000 steps daily Muscle: maintain your muscle mass (strength training 2X/wk)  -Wheat, not white (bread, pasta, crackers, jordan, bagels, tortillas, rice)  -Limit restaurant, cafeteria, take out, drive through to 2 times per week or less  -Minimize caffeine, alcohol, and night-time snacking  -Consider keeping a food diary (i.e. My Fitness Pal, Lose It, or other food tracker)  -Follow up with the dietitian      **Some lean proteins: chicken, turkey, tuna, salmon, crab, fish, shrimp, scallops, lobster, lean cuts of beef and pork, luncheon meats, veggie burgers, beans (black, lima, garbanzo, diallo, kidney, refried), chile, cottage cheese, string cheese, other cheese, eggs, tofu, peanut butter, nuts, vegan crumbles, greek yogurt

## 2021-06-15 NOTE — PROGRESS NOTES
Peter Arora is a 50 y.o. male who is being evaluated via a billable telephone visit.      What phone number would you like to be contacted at? [phone]  How would you like to obtain your AVS? AVS Preference: [place].      Subjective   Peter Arora is 50 y.o. and presents today for the following health issues   HPI     Pt set up for phone visit today which was not appropriate.  Pt has been off medication for a couple of years and looking to restart.  Has had visual changes.  Is having trouble breathing with eating and drinking or even talking.  Pt needs face to face visit and evaluation.  He was prediabtic a couple of years ago and states he has gained more weight- high probability that he has diabetes.  Discussed with patient that we will get him into clinic this week- next available.  None of these issues are new but have been slowly increasing over the last couple of years.  We will no change this phone visit and set up face to face.

## 2021-06-15 NOTE — PROGRESS NOTES
[employer] message sent with results. Statin and baby aspirin sent to pharmacy.    The 10-year ASCVD risk score (Braden GALO Jr., et al., 2013) is: 15.7%    Values used to calculate the score:      Age: 50 years      Sex: Male      Is Non- : No      Diabetic: Yes      Tobacco smoker: No      Systolic Blood Pressure: 157 mmHg      Is BP treated: Yes      HDL Cholesterol: 38 mg/dL      Total Cholesterol: 228 mg/dL

## 2021-06-16 PROBLEM — E66.01 MORBID OBESITY (H): Status: ACTIVE | Noted: 2019-03-25

## 2021-06-16 PROBLEM — R73.03 PREDIABETES: Status: ACTIVE | Noted: 2019-03-27

## 2021-06-16 PROBLEM — E11.9 DIABETES MELLITUS, TYPE 2 (H): Status: ACTIVE | Noted: 2021-03-04

## 2021-06-16 NOTE — PROGRESS NOTES
Assessment/plan   1. Essential hypertension  Blood pressure at goal today on losartan.  Recheck blood work today and continue with current dosing.  - Basic Metabolic Panel    2. Class 3 severe obesity due to excess calories without serious comorbidity with body mass index (BMI) of 40.0 to 44.9 in adult (H)  Body mass index is 44.77 kg/m .  Congratulated patient on 17 pound weight loss.  He has been very diligent with his diet and works a labor-intensive job.  His EFRAÍN symptoms have resolved with weight loss so he does not feel a sleep study is still necessary.  Plan to check in in 3 months to ensure he is still doing well.    3. Type 2 diabetes mellitus with microalbuminuria, without long-term current use of insulin (H)  Hemoglobin A1c recently checked and 7.4% on Metformin.  Will obtain microalbumin today, he is already on losartan.  He is also on a statin and baby aspirin.  Plan to recheck his A1c in about 6 months.  - metFORMIN (GLUCOPHAGE XR) 500 MG 24 hr tablet; Take 2 tablets (1,000 mg total) by mouth 2 (two) times a day.  Dispense: 90 tablet; Refill: 1  - Microalbumin, Random Urine        Follow up: 3 months weight loss     Alma Delia Miranda DO    Options for treatment and follow-up care were reviewed with the patient. Peter Arora engaged in the decision making process and verbalized understanding of the options discussed and agreed with the final plan.    This note has been dictated using voice recognition software. Any grammatical or context distortions are unintentional and inherent to the software.      Subjective:      HPI: Peter Arora is a 50 y.o. male who is here for:    Chief Complaint   Patient presents with     Hypertension     recheck of bp after started losartan     Weight Loss     weight check     Losartan started 3/4/2021 for hypertension. Down 17 lbs. he is cut out bread, pasta, sweets.  He eats a lot of vegetables now.  He is the primary cook in his house so his family members  "have also lost weight.  They are all feeling well.  He states it is difficult but he has noticed a significant improvement.  He no longer snores loudly at night and denies feelings of orthopnea.  He does admit to indulging in alcohol this past weekend, but prior to this he had minimal alcohol intake.  He has been taking his medications as prescribed without issues.  He has not been exercising, but has a very labor-intensive job.  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    Objective:    /90   Pulse 73   Temp 97.3  F (36.3  C) (Oral)   Ht 5' 10\" (1.778 m)   Wt (!) 312 lb (141.5 kg)   BMI 44.77 kg/m      Physical Exam:   General: Alert, pleasant, cooperative, NAD  HEENT: NC/AT, EOMI, PERRL, normal conjunctivae and sclerae  CV: RRR, no murmurs, rubs or gallops  Respiratory: normal effort, lungs CTAB with good aeration throughout  Extremities: warm, trace edema b/l lower extremity edema    No results found for this or any previous visit (from the past 24 hour(s)).  "

## 2021-06-16 NOTE — PATIENT INSTRUCTIONS - HE
Weight Loss Basics    Remember to:    -Eat 3 meals a day (not 2, not 5) Chew your food well/SLOW down  -Eat your protein first  -Be a water drinker/Minize liquid calories (no regular pop, no juice) skim or 1% milk OK  -Sleep 7-8 hours each night. Address sleep if problematic  -Stress management is important. Address if problematic  -Move-8000 steps daily Muscle: maintain your muscle mass (strength training 2X/wk)  -Wheat, not white (bread, pasta, crackers, jordan, bagels, tortillas, rice)  -Limit restaurant, cafeteria, take out, drive through to 2 times per week or less  -Minimize caffeine, alcohol, and night-time snacking  -Consider keeping a food diary (i.e. My Fitness Pal, Lose It, or other food tracker)  -Follow up with the dietitian      **Some lean proteins: chicken, turkey, tuna, salmon, crab, fish, shrimp, scallops, lobster, lean cuts of beef and pork, luncheon meats, veggie burgers, beans (black, lima, garbanzo, diallo, kidney, refried), chile, cottage cheese, string cheese, other cheese, eggs, tofu, peanut butter, nuts, vegan crumbles, greek yogurt

## 2021-06-17 NOTE — TELEPHONE ENCOUNTER
Medication refill request declined- requested refill too soon   Disp Refills Start End     losartan (COZAAR) 25 MG tablet 90 tablet 0 3/5/2021     Sig: TAKE 1 TABLET(25 MG) BY MOUTH DAILY    Sent to pharmacy as: losartan 25 mg tablet (COZAAR)    Notes to Pharmacy: **Patient requests 90 days supply**    E-Prescribing Status: Receipt confirmed by pharmacy (3/5/2021 10:33 AM CST)      Kate Del Cid RN BSBA [facility] Triage/Med Refill 4/28/2021 2:59 PM

## 2021-06-17 NOTE — TELEPHONE ENCOUNTER
RN cannot approve Refill Request    RN can NOT refill this medication Refill requested too soon. Has enough through 06/05/2021. Last office visit: 4/5/2021 Alma Delia Miranda DO Last Physical: Visit date not found Last MTM visit: Visit date not found Last visit same specialty: 4/5/2021 Alma Delia Miranda DO.  Next visit within 3 mo: Visit date not found  Next physical within 3 mo: Visit date not found      Kaley Hines, [facility] Triage/Med Refill 5/2/2021    Requested Prescriptions   Pending Prescriptions Disp Refills     losartan (COZAAR) 25 MG tablet [Pharmacy Med Name: LOSARTAN 25MG TABLETS] 30 tablet 0     Sig: TAKE 1 TABLET(25 MG) BY MOUTH DAILY       Angiotensin Receptor Blocker Protocol Passed - 5/1/2021  3:38 AM        Passed - PCP or prescribing provider visit in past 12 months       Last office visit with prescriber/PCP: 4/5/2021 Alma Delia Miranda DO OR same dept: 4/5/2021 Alma Delia Miranda DO OR same specialty: 4/5/2021 Alma Delia Miranda DO  Last physical: Visit date not found Last MTM visit: Visit date not found   Next visit within 3 mo: Visit date not found  Next physical within 3 mo: Visit date not found  Prescriber OR PCP: Alma Delia Miranda DO  Last diagnosis associated with med order: 1. Essential hypertension  - losartan (COZAAR) 25 MG tablet [Pharmacy Med Name: LOSARTAN 25MG TABLETS]; TAKE 1 TABLET(25 MG) BY MOUTH DAILY  Dispense: 30 tablet; Refill: 0    If protocol passes may refill for 12 months if within 3 months of last provider visit (or a total of 15 months).             Passed - Serum potassium within the past 12 months     Lab Results   Component Value Date    Potassium 4.4 04/05/2021             Passed - Blood pressure filed in past 12 months     BP Readings from Last 1 Encounters:   04/05/21 141/90             Passed - Serum creatinine within the past 12 months     Creatinine   Date Value Ref Range Status   04/05/2021 0.76 0.70 - 1.30 mg/dL Final

## 2021-06-17 NOTE — PATIENT INSTRUCTIONS - HE
Patient Instructions by Paul Spencer MD at 3/15/2019  3:00 PM     Author: Paul Spencer MD Service: -- Author Type: Physician    Filed: 3/15/2019  4:34 PM Encounter Date: 3/15/2019 Status: Addendum    : Paul Spencer MD (Physician)    Related Notes: Original Note by Paul Spencer MD (Physician) filed at 3/15/2019  4:34 PM       - Take prednisone and azithromycin as directed.   - Use albuterol as needed.   - Follow up with Primary Care within 2 weeks to discuss your chest symptoms, determine if a long-term steroid inhaler may be appropriate, and determine if a sleep study is indicated.       Patient Education     Bronchitis, Antibiotic Treatment (Adult)    Bronchitis is an infection of the air passages (bronchial tubes) in your lungs. It often occurs when you have a cold. This illness is contagious during the first few days and is spread through the air by coughing and sneezing, or by direct contact (touching the sick person and then touching your own eyes, nose, or mouth).  Symptoms of bronchitis include cough with mucus (phlegm) and low-grade fever. Bronchitis usually lasts 7 to 14 days. Mild cases can be treated with simple home remedies. More severe infection is treated with an antibiotic.  Home care  Follow these guidelines when caring for yourself at home:    If your symptoms are severe, rest at home for the first 2 to 3 days. When you go back to your usual activities, don't let yourself get too tired.    Do not smoke. Also avoid being exposed to secondhand smoke.    You may use over-the-counter medicines to control fever or pain, unless another medicine was prescribed. If you have chronic liver or kidney disease or have ever had a stomach ulcer or gastrointestinal bleeding, talk with your healthcare provider before using these medicines. Also talk to your provider if you are taking medicine to prevent blood clots. Aspirin should never be given to anyone younger than 18 years of  age who is ill with a viral infection or fever. It may cause severe liver or brain damage.    Your appetite may be poor, so a light diet is fine. Avoid dehydration by drinking 6 to 8 glasses of fluids per day (such as water, soft drinks, sports drinks, juices, tea, or soup). Extra fluids will help loosen secretions in the nose and lungs.    Over-the-counter cough, cold, and sore-throat medicines will not shorten the length of the illness, but they may be helpful to reduce symptoms. (Note: Do not use decongestants if you have high blood pressure.)    Finish all antibiotic medicine. Do this even if you are feeling better after only a few days.  Follow-up care  Follow up with your healthcare provider, or as advised. If you had an X-ray or ECG (electrocardiogram), a specialist will review it. You will be notified of any new findings that may affect your care.  If you are age 65 or older, or if you have a chronic lung disease or condition that affects your immune system, or you smoke, ask your healthcare provider about getting a pneumococcal vaccine and a yearly flu shot (influenza vaccine).  When to seek medical advice  Call your healthcare provider right away if any of these occur:    Fever of 100.4 F (38 C) or higher, or as directed by your healthcare provider    Coughing up increased amounts of colored sputum    Weakness, drowsiness, headache, facial pain, ear pain, or a stiff neck  Call 911  Call 911 if any of these occur.    Coughing up blood    Worsening weakness, drowsiness, headache, or stiff neck    Trouble breathing, wheezing, or pain with breathing  Date Last Reviewed: 9/13/2015 2000-2017 [employer]. [address] [place] [address], [place] [address]. All rights reserved. This information is not intended as a substitute for professional medical care. Always follow your healthcare professional's instructions.

## 2021-06-19 NOTE — LETTER
Letter by Thania Moody MD at      Author: Thania Moody MD Service: -- Author Type: --    Filed:  Encounter Date: 3/26/2019 Status: (Other)         Peter Arora  [address]  [place] [address]             March 26, 2019         Dear Mr. Arora,    Below are the results from your recent visit:    Resulted Orders   Glycosylated Hemoglobin A1c   Result Value Ref Range    Hemoglobin A1c 6.3 (H) 3.5 - 6.0 %        The test for screening diabetes is borderline.  Hemoglobin A1c is 6.3.  This indicates prediabetes (hemoglobin A1c between 5.7 and 6.4 is prediabetes and 6.5 or greater is Diabetes).  I recommend dietary modification and exercise and a repeat check in 3 months time.  A nutritional consult can also be considered.  If no improvement then we can consider starting on some medications.     Thania Moody MD  3/25/2019   P.S-I have sent  my chart message    Please call with questions or contact us using [employer].    Sincerely,        Electronically signed by Thania Moody MD

## 2021-06-19 NOTE — LETTER
Letter by Papo Duron MD at      Author: Papo Duron MD Service: -- Author Type: --    Filed:  Encounter Date: 8/15/2019 Status: (Other)       Peter Arora  [address]  [place] [address]    August 15, 2019    Dear Peter    In reviewing your records, we have determined a gap in your preventive services. Based on your age and health history, we recommend the follow service.     ? General Physical  ? Physical with a Pap Smear  ? Colon cancer screening  ? Mammogram  ? Immunization  ? Diabetic check  ? Blood pressure  ? Asthma check  ? Cholesterol test  ? Lab work  ? Med check    If you have had the service elsewhere, please contact us so we can update our records. Please let us know if you have transferred your care to another clinic.    Please call [phone] to schedule a nurse only appointment.    We believe that a strong preventive care program, including regular physicals and follow-up care is an important part of a healthy lifestyle and we are committed to helping you maintain your health.    Thank you for choosing us as your health care provider.    Sincerely,   [facility]  [address] [facility] [address]  Phone Number:  [phone]

## 2021-06-25 NOTE — TELEPHONE ENCOUNTER
Cough for the past month     Does have allergies    Was just seen a week ago and did finish his medication.    No problems with breathing    No fever    Has an appointment on Monday    Advised more home treatments, increase hydration, nasal wash, nasal spray medication, humidifier and keep the appointment for Monday.    Kath Phelps, RN  [facility] Medication Refill and Triage Nurse  3/21/2019  11:14 AM      Reason for Disposition    Cough with no complications    Protocols used: COUGH-A-OH

## 2021-06-25 NOTE — PROGRESS NOTES
Subjective:   Peter Arora is a(n) 48 y.o. White or  male who presents to [facility] with the following complaint(s):  Cough (coughing and wheezing x1mth, night time is worse )    History of Present Illness:  Primary symptom: Cough  Onset: 1 month  Progression: Persisting, worst overnight  Description of cough: Dry, hacking  Sputum production: Yes, clear / foamy  Hemoptysis: No  Shortness of breath: Overnight only. Denies orthopnea. Snores chronically. Wife has not appreciated apneic spells.   Chest wall pain: Left posterior chest wall  Upper respiratory symptoms: Has had nasal congestion for the past couple of weeks. Has minimal nasal secretions. Has chronic post-nasal drip. Has sinus pain / pressure for the past 1-2 weeks. Had a sore throat last week.   Fevers: No  Chills: No  Additional symptoms: Hears himself wheezing, especially at night.   Home therapies utilized: None  History of asthma: No  History of COPD: No  History of pneumonia: No  History of CHF: No  Recent travel: No  Ill contacts: No  Tobacco user / exposure: No    The following portions of the patient's history were reviewed and updated as appropriate: allergies, current medications, past family history, past medical history, past social history, past surgical history and problem list.    Review of Systems:   Review of Systems   All other systems reviewed and are negative.    Objective:     Vitals:    03/15/19 1506   BP: (!) 157/103   Pulse: 97   Resp: 16   Temp: 98.2  F (36.8  C)   TempSrc: Oral   SpO2: 94%   Weight: (!) 310 lb 8 oz (140.8 kg)     Physical Exam   Constitutional: He is oriented to person, place, and time. He appears well-developed.  Non-toxic appearance. He does not appear ill. No distress.   Obese.    HENT:   Head: Normocephalic and atraumatic.   Right Ear: Tympanic membrane, external ear and ear canal normal.   Left Ear: Tympanic membrane, external ear and ear canal normal.   Nose: No mucosal edema or  rhinorrhea. Right sinus exhibits no maxillary sinus tenderness and no frontal sinus tenderness. Left sinus exhibits no maxillary sinus tenderness and no frontal sinus tenderness.   Mouth/Throat: Uvula is midline, oropharynx is clear and moist and mucous membranes are normal. No oral lesions.   Eyes: Conjunctivae and lids are normal.   Neck: Neck supple. No edema and no erythema present.   Cardiovascular: Normal rate, regular rhythm, S1 normal and S2 normal. Exam reveals no gallop and no friction rub.   No murmur heard.  No lower extremity edema.    Pulmonary/Chest: Effort normal. No stridor. He has wheezes (scattered). He has no rhonchi. He has no rales.   Lymphadenopathy:     He has no cervical adenopathy.   Neurological: He is alert and oriented to person, place, and time.   Skin: Skin is warm and dry. No pallor.   Nursing note and vitals reviewed.    Laboratory:  N/A    Radiology:  EXAM DATE:         03/15/2019    [facility]  X-RAY CHEST, 2 VIEWS, FRONTAL AND LATERAL  3/15/2019 3:45 PM    INDICATION: Cough, wheezing x 1 month  COMPARISON: 10/3/2016    FINDINGS: No pleural fluid or pneumothorax. No airspace disease or edema. Normal  size of the heart. Degenerative changes are seen in the spine.    Electrocardiogram:  N/A    Assessment/Plan   1. Bronchitis  - XR Chest 2 Views; Future  - albuterol nebulizer solution 3 mL (PROVENTIL)  - XR Chest 2 Views  - azithromycin (ZITHROMAX Z-KATTY) 250 MG tablet; Take 2 tablets (500 mg) on  Day 1,  followed by 1 tablet (250 mg) once daily on Days 2 through 5.  Dispense: 6 tablet; Refill: 0  - predniSONE (DELTASONE) 20 MG tablet; Take 40 mg by mouth daily for 5 days.  Dispense: 10 tablet; Refill: 0  - albuterol (PROAIR HFA;PROVENTIL HFA;VENTOLIN HFA) 90 mcg/actuation inhaler; Inhale 2 puffs every 4 (four) hours as needed for wheezing or shortness of breath.  Dispense: 1 each; Refill: 0    - Chest x-ray result reviewed with patient and his wife. Albuterol neb  administered in clinic with improvement in scattered wheezing. Treating with prednisone and azithromycin as listed above. Prescribed an albuterol inhaler to be used as needed.   - Counseled patient and his wife regarding assessment and plan for evaluation and treatment. Questions were answered. See AVS for the specific written instructions and educational handout(s) regarding bronchitis that were provided at the conclusion of the visit.   - Discussed signs / symptoms that warrant urgent / emergent medical attention.   - Follow up with PCP within 2 weeks. Advised patient that discussion regarding workup for obstructive sleep apnea and consideration of starting a maintenance inhaler may be appropriate at that time.     Paul Spencer MD

## 2021-06-25 NOTE — TELEPHONE ENCOUNTER
metFORMIN (GLUCOPHAGE XR) 500 MG 24 hr tablet  1,000 mg, Oral, 2 times daily 5/20/2021 HUNG  tablet 3 ordered           Edit       Summary: Take 2 tablets (1,000 mg total) by mouth 2 (two) times a day., Starting Thu 5/20/2021, No Print   Dose, Frequency: 1,000 mg, 2 times daily  Ord/Sold: 5/20/2021 (O)  Report  Taking:   Long-term:   Pharmacy: [id number] [address] AT [address]  Med Dose History       Patient Sig: Take 2 tablets (1,000 mg total) by mouth 2 (two) times a day.       Ordered on: 5/20/2021        CLASS:  No print.  Sending refill to pharmacy per protocol.    Refill Approved    Rx renewed per Medication Renewal Policy. Medication was last renewed on 4/5/21.    Jacoby Heath, [facility] Triage/Med Refill 6/8/2021     Requested Prescriptions   Pending Prescriptions Disp Refills     metFORMIN (GLUCOPHAGE-XR) 500 MG 24 hr tablet [Pharmacy Med Name: METFORMIN ER 500MG 24HR TABS] 90 tablet 0     Sig: TAKE 1 DAILY BEFORE BREAKFAST. IN 2 WEEKS TAKE 1 TWICE DAILY. IN 2 WEEKS TAKE 2 IN MORNING AND 1 IN THE EVENING, THEN TAKE 2 TABLETS TWICE DAILY       Metformin Refill Protocol Passed - 6/7/2021  9:26 AM        Passed - Blood pressure in last 12 months     BP Readings from Last 1 Encounters:   04/05/21 141/90             Passed - LFT or AST or ALT in last 12 months     Albumin   Date Value Ref Range Status   03/04/2021 4.1 3.5 - 5.0 g/dL Final     Bilirubin, Total   Date Value Ref Range Status   03/04/2021 0.5 0.0 - 1.0 mg/dL Final     Alkaline Phosphatase   Date Value Ref Range Status   03/04/2021 103 45 - 120 U/L Final     AST   Date Value Ref Range Status   03/04/2021 21 0 - 40 U/L Final     ALT   Date Value Ref Range Status   03/04/2021 41 0 - 45 U/L Final     Protein, Total   Date Value Ref Range Status   03/04/2021 7.2 6.0 - 8.0 g/dL Final                Passed - GFR or Serum Creatinine in last 6 months     GFR MDRD Non Af Amer    Date Value Ref Range Status   04/05/2021 >60 >60 mL/min/1.73m2 Final     GFR MDRD Af Amer   Date Value Ref Range Status   04/05/2021 >60 >60 mL/min/1.73m2 Final             Passed - Visit with PCP or prescribing provider visit in last 6 months or next 3 months     Last office visit with prescriber/PCP: 4/5/2021 OR same dept: 4/5/2021 Alma Delia Miranda DO OR same specialty: 4/5/2021 Alma Delia Miranda DO Last physical: Visit date not found Last MTM visit: Visit date not found         Next appt within 3 mo: Visit date not found  Next physical within 3 mo: Visit date not found  Prescriber OR PCP: Alma Delia Miranda DO  Last diagnosis associated with med order: 1. Type 2 diabetes mellitus with microalbuminuria, without long-term current use of insulin (H)  - metFORMIN (GLUCOPHAGE-XR) 500 MG 24 hr tablet [Pharmacy Med Name: METFORMIN ER 500MG 24HR TABS]; TAKE 1 DAILY BEFORE BREAKFAST. IN 2 WEEKS TAKE 1 TWICE DAILY. IN 2 WEEKS TAKE 2 IN MORNING AND 1 IN THE EVENING, THEN TAKE 2 TABLETS TWICE DAILY  Dispense: 90 tablet; Refill: 0     If protocol passes may refill for 12 months if within 3 months of last provider visit (or a total of 15 months).           Passed - A1C in last 6 months     Hemoglobin A1c   Date Value Ref Range Status   03/04/2021 7.4 (H) <=5.6 % Final               Passed - Microalbumin in last year      Microalbumin, Random Urine   Date Value Ref Range Status   04/05/2021 6.83 (H) 0.00 - 1.99 mg/dL Final

## 2021-07-04 NOTE — ADDENDUM NOTE
Addendum Note by Hung Nye DO at 3/4/2021  9:00 AM     Author: Hung Nye DO Service: -- Author Type: Physician    Filed: 3/4/2021 11:47 AM Encounter Date: 3/4/2021 Status: Signed    : Hung Nye DO (Physician)    Addended by: HUNG NYE on: 3/4/2021 11:47 AM        Modules accepted: Orders

## 2021-07-04 NOTE — ADDENDUM NOTE
Addendum Note by Hung Nye DO at 3/4/2021  9:00 AM     Author: Hung Nye DO Service: -- Author Type: Physician    Filed: 3/5/2021  9:16 AM Encounter Date: 3/4/2021 Status: Signed    : Hung Nye DO (Physician)    Addended by: HUNG NYE on: 3/5/2021 09:16 AM        Modules accepted: Orders

## 2021-08-21 ENCOUNTER — HEALTH MAINTENANCE LETTER (OUTPATIENT)
Age: 51
End: 2021-08-21

## 2021-10-16 ENCOUNTER — HEALTH MAINTENANCE LETTER (OUTPATIENT)
Age: 51
End: 2021-10-16

## 2021-12-11 ENCOUNTER — HEALTH MAINTENANCE LETTER (OUTPATIENT)
Age: 51
End: 2021-12-11

## 2021-12-22 DIAGNOSIS — E11.29 TYPE 2 DIABETES MELLITUS WITH MICROALBUMINURIA, WITHOUT LONG-TERM CURRENT USE OF INSULIN (H): ICD-10-CM

## 2021-12-22 DIAGNOSIS — R80.9 TYPE 2 DIABETES MELLITUS WITH MICROALBUMINURIA, WITHOUT LONG-TERM CURRENT USE OF INSULIN (H): ICD-10-CM

## 2021-12-22 RX ORDER — METFORMIN HCL 500 MG
TABLET, EXTENDED RELEASE 24 HR ORAL
Qty: 180 TABLET | Refills: 0 | Status: SHIPPED | OUTPATIENT
Start: 2021-12-22 | End: 2022-03-22

## 2021-12-31 ENCOUNTER — HOSPITAL ENCOUNTER (EMERGENCY)
Facility: HOSPITAL | Age: 51
Discharge: HOME | End: 2021-12-31
Attending: EMERGENCY MEDICINE | Admitting: EMERGENCY MEDICINE
Payer: COMMERCIAL

## 2021-12-31 ENCOUNTER — NURSE TRIAGE (OUTPATIENT)
Dept: NURSING | Facility: CLINIC | Age: 51
End: 2021-12-31
Payer: COMMERCIAL

## 2021-12-31 ENCOUNTER — APPOINTMENT (OUTPATIENT)
Dept: CT IMAGING | Facility: HOSPITAL | Age: 51
End: 2021-12-31
Attending: EMERGENCY MEDICINE
Payer: COMMERCIAL

## 2021-12-31 VITALS
HEIGHT: 70 IN | DIASTOLIC BLOOD PRESSURE: 90 MMHG | SYSTOLIC BLOOD PRESSURE: 141 MMHG | HEART RATE: 96 BPM | WEIGHT: 290 LBS | BODY MASS INDEX: 41.52 KG/M2 | OXYGEN SATURATION: 95 % | RESPIRATION RATE: 14 BRPM | TEMPERATURE: 97.8 F

## 2021-12-31 DIAGNOSIS — J69.0 ASPIRATION PNEUMONITIS (H): ICD-10-CM

## 2021-12-31 DIAGNOSIS — S22.32XA CLOSED FRACTURE OF ONE RIB OF LEFT SIDE, INITIAL ENCOUNTER: ICD-10-CM

## 2021-12-31 LAB
ABO/RH(D): NORMAL
ALBUMIN SERPL-MCNC: 3.7 G/DL (ref 3.5–5)
ALP SERPL-CCNC: 87 U/L (ref 45–120)
ALT SERPL W P-5'-P-CCNC: 28 U/L (ref 0–45)
ANION GAP SERPL CALCULATED.3IONS-SCNC: 11 MMOL/L (ref 5–18)
ANTIBODY SCREEN: NEGATIVE
APTT PPP: 29 SECONDS (ref 22–38)
AST SERPL W P-5'-P-CCNC: 18 U/L (ref 0–40)
BASOPHILS # BLD AUTO: 0.1 10E3/UL (ref 0–0.2)
BASOPHILS NFR BLD AUTO: 0 %
BILIRUB DIRECT SERPL-MCNC: 0.1 MG/DL
BILIRUB SERPL-MCNC: 0.5 MG/DL (ref 0–1)
BUN SERPL-MCNC: 11 MG/DL (ref 8–22)
CALCIUM SERPL-MCNC: 9.1 MG/DL (ref 8.5–10.5)
CHLORIDE BLD-SCNC: 101 MMOL/L (ref 98–107)
CO2 SERPL-SCNC: 29 MMOL/L (ref 22–31)
CREAT SERPL-MCNC: 0.85 MG/DL (ref 0.7–1.3)
EOSINOPHIL # BLD AUTO: 0.4 10E3/UL (ref 0–0.7)
EOSINOPHIL NFR BLD AUTO: 3 %
ERYTHROCYTE [DISTWIDTH] IN BLOOD BY AUTOMATED COUNT: 12.3 % (ref 10–15)
FLUAV RNA SPEC QL NAA+PROBE: NEGATIVE
FLUBV RNA RESP QL NAA+PROBE: NEGATIVE
GFR SERPL CREATININE-BSD FRML MDRD: >90 ML/MIN/1.73M2
GLUCOSE BLD-MCNC: 111 MG/DL (ref 70–125)
HCT VFR BLD AUTO: 46 % (ref 40–53)
HGB BLD-MCNC: 15.5 G/DL (ref 13.3–17.7)
HOLD SPECIMEN: NORMAL
IMM GRANULOCYTES # BLD: 0.1 10E3/UL
IMM GRANULOCYTES NFR BLD: 1 %
INR PPP: 1.06 (ref 0.9–1.15)
LYMPHOCYTES # BLD AUTO: 3.2 10E3/UL (ref 0.8–5.3)
LYMPHOCYTES NFR BLD AUTO: 24 %
MCH RBC QN AUTO: 31.7 PG (ref 26.5–33)
MCHC RBC AUTO-ENTMCNC: 33.7 G/DL (ref 31.5–36.5)
MCV RBC AUTO: 94 FL (ref 78–100)
MONOCYTES # BLD AUTO: 1.1 10E3/UL (ref 0–1.3)
MONOCYTES NFR BLD AUTO: 8 %
NEUTROPHILS # BLD AUTO: 8.7 10E3/UL (ref 1.6–8.3)
NEUTROPHILS NFR BLD AUTO: 64 %
NRBC # BLD AUTO: 0 10E3/UL
NRBC BLD AUTO-RTO: 0 /100
PLATELET # BLD AUTO: 272 10E3/UL (ref 150–450)
POTASSIUM BLD-SCNC: 4.6 MMOL/L (ref 3.5–5)
PROT SERPL-MCNC: 7.3 G/DL (ref 6–8)
RBC # BLD AUTO: 4.89 10E6/UL (ref 4.4–5.9)
SARS-COV-2 RNA RESP QL NAA+PROBE: NEGATIVE
SODIUM SERPL-SCNC: 141 MMOL/L (ref 136–145)
SPECIMEN EXPIRATION DATE: NORMAL
TROPONIN I SERPL-MCNC: 0.02 NG/ML (ref 0–0.29)
WBC # BLD AUTO: 13.5 10E3/UL (ref 4–11)

## 2021-12-31 PROCEDURE — 250N000011 HC RX IP 250 OP 636: Performed by: EMERGENCY MEDICINE

## 2021-12-31 PROCEDURE — 85025 COMPLETE CBC W/AUTO DIFF WBC: CPT | Performed by: EMERGENCY MEDICINE

## 2021-12-31 PROCEDURE — 85610 PROTHROMBIN TIME: CPT | Performed by: EMERGENCY MEDICINE

## 2021-12-31 PROCEDURE — 74177 CT ABD & PELVIS W/CONTRAST: CPT

## 2021-12-31 PROCEDURE — 96376 TX/PRO/DX INJ SAME DRUG ADON: CPT | Mod: 59

## 2021-12-31 PROCEDURE — C9803 HOPD COVID-19 SPEC COLLECT: HCPCS

## 2021-12-31 PROCEDURE — 71275 CT ANGIOGRAPHY CHEST: CPT

## 2021-12-31 PROCEDURE — 87636 SARSCOV2 & INF A&B AMP PRB: CPT | Performed by: EMERGENCY MEDICINE

## 2021-12-31 PROCEDURE — 36415 COLL VENOUS BLD VENIPUNCTURE: CPT | Performed by: EMERGENCY MEDICINE

## 2021-12-31 PROCEDURE — 85730 THROMBOPLASTIN TIME PARTIAL: CPT | Performed by: EMERGENCY MEDICINE

## 2021-12-31 PROCEDURE — 82310 ASSAY OF CALCIUM: CPT | Performed by: EMERGENCY MEDICINE

## 2021-12-31 PROCEDURE — 250N000013 HC RX MED GY IP 250 OP 250 PS 637: Performed by: EMERGENCY MEDICINE

## 2021-12-31 PROCEDURE — 86850 RBC ANTIBODY SCREEN: CPT | Performed by: EMERGENCY MEDICINE

## 2021-12-31 PROCEDURE — 86901 BLOOD TYPING SEROLOGIC RH(D): CPT | Performed by: EMERGENCY MEDICINE

## 2021-12-31 PROCEDURE — 96374 THER/PROPH/DIAG INJ IV PUSH: CPT | Mod: 59

## 2021-12-31 PROCEDURE — 99285 EMERGENCY DEPT VISIT HI MDM: CPT | Mod: 25

## 2021-12-31 PROCEDURE — 84484 ASSAY OF TROPONIN QUANT: CPT | Performed by: EMERGENCY MEDICINE

## 2021-12-31 PROCEDURE — 82248 BILIRUBIN DIRECT: CPT | Performed by: EMERGENCY MEDICINE

## 2021-12-31 RX ORDER — OXYCODONE HYDROCHLORIDE 5 MG/1
5 TABLET ORAL EVERY 6 HOURS PRN
Qty: 12 TABLET | Refills: 0 | Status: SHIPPED | OUTPATIENT
Start: 2021-12-31 | End: 2022-01-03

## 2021-12-31 RX ORDER — DOXYCYCLINE 100 MG/1
100 CAPSULE ORAL ONCE
Status: COMPLETED | OUTPATIENT
Start: 2021-12-31 | End: 2021-12-31

## 2021-12-31 RX ORDER — LIDOCAINE 4 G/G
1 PATCH TOPICAL ONCE
Status: DISCONTINUED | OUTPATIENT
Start: 2021-12-31 | End: 2021-12-31 | Stop reason: HOSPADM

## 2021-12-31 RX ORDER — IOPAMIDOL 755 MG/ML
100 INJECTION, SOLUTION INTRAVASCULAR ONCE
Status: COMPLETED | OUTPATIENT
Start: 2021-12-31 | End: 2021-12-31

## 2021-12-31 RX ADMIN — HYDROMORPHONE HYDROCHLORIDE 0.5 MG: 1 INJECTION, SOLUTION INTRAMUSCULAR; INTRAVENOUS; SUBCUTANEOUS at 12:29

## 2021-12-31 RX ADMIN — HYDROMORPHONE HYDROCHLORIDE 0.5 MG: 1 INJECTION, SOLUTION INTRAMUSCULAR; INTRAVENOUS; SUBCUTANEOUS at 15:45

## 2021-12-31 RX ADMIN — LIDOCAINE 1 PATCH: 246 PATCH TOPICAL at 15:06

## 2021-12-31 RX ADMIN — AMOXICILLIN AND CLAVULANATE POTASSIUM 1 TABLET: 875; 125 TABLET, FILM COATED ORAL at 16:06

## 2021-12-31 RX ADMIN — HYDROMORPHONE HYDROCHLORIDE 0.5 MG: 1 INJECTION, SOLUTION INTRAMUSCULAR; INTRAVENOUS; SUBCUTANEOUS at 10:47

## 2021-12-31 RX ADMIN — IOPAMIDOL 100 ML: 755 INJECTION, SOLUTION INTRAVENOUS at 13:16

## 2021-12-31 RX ADMIN — DOXYCYCLINE 100 MG: 100 CAPSULE ORAL at 15:44

## 2021-12-31 ASSESSMENT — ENCOUNTER SYMPTOMS
FLANK PAIN: 1
ABDOMINAL PAIN: 1
DYSURIA: 0
HEMATURIA: 0
DIARRHEA: 0
VOMITING: 0
FEVER: 0
SORE THROAT: 1
COUGH: 1
CHILLS: 0

## 2021-12-31 ASSESSMENT — MIFFLIN-ST. JEOR: SCORE: 2181.68

## 2021-12-31 NOTE — DISCHARGE INSTRUCTIONS
Read and follow the discharge instructions.    The CT of your chest did not show any blood clots but he shows a concern for aspiration pneumonia.  You were given a dose of antibiotics here.  You are not due for antibiotics until tomorrow    Use the incentive spirometer as instructed.    Take Tylenol 1000 mg every 8 hours for 3 days only.    Take your oxycodone as needed for pain do not drive or drink alcohol while taking this medication.  I be constipated.  Make sure you take a stool softener.    Call your primary care doctor on Monday to make a follow-up appointment.    Return if you have worsening pain, shortness of breath, or any other concerns.

## 2021-12-31 NOTE — ED PROVIDER NOTES
EMERGENCY DEPARTMENT ENCOUNTER      NAME: Peter Arora  AGE: 51 year old male  YOB: 1970  MRN: [id number]  EVALUATION DATE & TIME: No admission date for patient encounter.    PCP: Papo Duron    ED PROVIDER: Flory Hernandez M.D.      CHIEF COMPLAINT     Chief Complaint   Patient presents with     Flank Pain     Abdominal Pain         FINAL IMPRESSION:     1. Closed fracture of one rib of left side, initial encounter    2. Aspiration pneumonitis (H)          MEDICAL DECISION MAKING:       Pertinent Labs & Imaging studies reviewed. (See chart for details)    51 year old male presents to the Emergency Department for evaluation of left sided abdominal pain left sided back pain    ED Course as of 12/31/21 1910   Fri Dec 31, 2021   1904 % Lymphocytes: 24   1904 Bilirubin Direct: 0.1   1905 Potassium: 4.6   1905 Creatinine: 0.85   1906 Clinical impression and decision making  52 yo with cough then developed left sided and back abdominal pain.  Pain worse with movement touch breathing.  No trauma  Previous history of splenic trauma but no spleenectomy  Non toxic appears in pain  Ct no pe possible aspiration normal spleen  And 9th rib fx like secondary to coughing  Not requiring oxygen  Given incentive spirometer  Discharged with augmentin and roxicodone  Patient reliable  Discharged ambulatory in stable condition         Differential Diagnosis (include but not limited to)  Pulmonary embolism, pneumothorax, perforated viscus, pancreatitis, acs dissection splenic laceration among others      Vital Signs: hypertensive  EKG: sinus  Imaging: ct no pe rib 9th fx aspiration normal spleen  Home Meds: reviewed  ED meds/abx: dilaudid augmentin doxycycline  Fluids: ns    Labs  K 4.6  Cr 0.85  Wbc 13.5  Hgb 15.5  Platelets 272  Troponin negative  covid negative    Review of Previous Records        Consults  Pulmonology, [place]    ED COURSE   9:49 AM I met with the patient for initial interview and  exam. Discussed plan of care including diagnostic testing and treatment.  10:35 AM I reevaluated the patient. He is now being moved to a hallway bed.  11:43 AM I rechecked the patient. He is feeling improved now.  2:22 PM I rechecked and updated the patient.  2:49 PM I rechecked and updated the patient.  3:07 PM spoke with pulmonology recommends Augmentin    4:00 PM ambulated without difficulty  Verbalizes understanding of discharge instructions and return precautions    At the conclusion of the encounter I discussed the results of all of the tests and the disposition. The questions were answered. The patient and wife acknowledged understanding and was agreeable with the care plan.           MEDICATIONS GIVEN IN THE EMERGENCY:     Medications   HYDROmorphone (DILAUDID) injection 0.5 mg (0.5 mg Intravenous Given 12/31/21 1047)   HYDROmorphone (DILAUDID) injection 0.5 mg (0.5 mg Intravenous Given 12/31/21 1229)   iopamidol (ISOVUE-370) solution 100 mL (100 mLs Intravenous Given 12/31/21 1316)   doxycycline hyclate (VIBRAMYCIN) capsule 100 mg (100 mg Oral Given 12/31/21 1544)   HYDROmorphone (DILAUDID) injection 0.5 mg (0.5 mg Intravenous Given 12/31/21 1545)   amoxicillin-clavulanate (AUGMENTIN) 875-125 MG per tablet 1 tablet (1 tablet Oral Given 12/31/21 1606)       NEW PRESCRIPTIONS STARTED AT TODAY'S ER VISIT     Discharge Medication List as of 12/31/2021  4:26 PM      START taking these medications    Details   amoxicillin-clavulanate (AUGMENTIN) 875-125 MG tablet Take 1 tablet by mouth 2 times daily for 7 days, Disp-14 tablet, R-0, Local Print      oxyCODONE (ROXICODONE) 5 MG tablet Take 1 tablet (5 mg) by mouth every 6 hours as needed for pain, Disp-12 tablet, R-0, Local Print                =================================================================    HPI     Patient information was obtained from: patient     Use of : N/A      Peter Arora is a 51 year old male who presents by private  car for evaluation of LUQ abdominal pain.    1 week ago, patient began feeling sick with symptoms including cough, sore throat. 4 days ago, patient developed constant, sharp LUQ abdominal pain radiating around to the left flank. He reports hemoptysis described as small streams of blood. Denies vomiting, diarrhea, dysuria, hematuria, leg swelling, rashes, fever, or chills. Has received 2 negative COVID19 tests in the past week; most recent test was yesterday. Patient is immunized against COVID19 x2. No known sick contact. No recent travel. Notes he recently injured his spleen in an accident, and chose not to have it removed.    PMHx: Hypertension.      REVIEW OF SYSTEMS   Review of Systems   Constitutional: Negative for chills and fever.   HENT: Positive for sore throat.    Eyes: Photophobia: productive for blood.   Respiratory: Positive for cough.    Cardiovascular: Negative for leg swelling.   Gastrointestinal: Positive for abdominal pain (LUQ). Negative for diarrhea and vomiting.   Genitourinary: Positive for flank pain (left). Negative for dysuria and hematuria.   Skin: Negative for rash.   All other systems reviewed and are negative.      PAST MEDICAL HISTORY:     Past Medical History:   Diagnosis Date     Carpal tunnel syndrome     Created by Conversion      Essential hypertension, benign     Created by Conversion      Hereditary and idiopathic peripheral neuropathy     Created by Conversion  Replacement Utility updated for latest IMO load     Obesity, unspecified     Created by Conversion      Other allergy, other than to medicinal agents     Created by Conversion        PAST SURGICAL HISTORY:   No past surgical history on file.      CURRENT MEDICATIONS:   amoxicillin-clavulanate (AUGMENTIN) 875-125 MG tablet  oxyCODONE (ROXICODONE) 5 MG tablet  aspirin 81 MG EC tablet  atorvastatin (LIPITOR) 20 MG tablet  losartan (COZAAR) 25 MG tablet  metFORMIN (GLUCOPHAGE-XR) 500 MG 24 hr tablet         ALLERGIES:  "    Allergies   Allergen Reactions     Lisinopril Unknown     Cough       FAMILY HISTORY:     Family History   Problem Relation Age of Onset     Diabetes Brother        SOCIAL HISTORY:     Social History     Socioeconomic History     Marital status: Single     Spouse name: Not on file     Number of children: Not on file     Years of education: Not on file     Highest education level: Not on file   Occupational History     Not on file   Tobacco Use     Smoking status: Never Smoker     Smokeless tobacco: Never Used   Substance and Sexual Activity     Alcohol use: Not on file     Drug use: Not on file     Sexual activity: Not on file   Other Topics Concern     Not on file   Social History Narrative     Not on file     Social Determinants of Health     Financial Resource Strain: Not on file   Food Insecurity: Not on file   Transportation Needs: Not on file   Physical Activity: Not on file   Stress: Not on file   Social Connections: Not on file   Intimate Partner Violence: Not on file   Housing Stability: Not on file       VITALS:   BP (!) 141/90   Pulse 96   Temp 97.8  F (36.6  C) (Temporal)   Resp 14   Ht 1.778 m (5' 10\")   Wt 131.5 kg (290 lb)   SpO2 95%   BMI 41.61 kg/m      PHYSICAL EXAM     Physical Exam  Vitals and nursing note reviewed.   Constitutional:       Appearance: He is well-developed. He is obese.   Abdominal:       Musculoskeletal:        Arms:    Neurological:      Mental Status: He is alert.         Physical Exam   Constitutional: Appears in pain. Non toxic    Head: Atraumatic.     Nose: Nose normal.     Mouth/Throat: Oropharynx is clear and moist.     Eyes: EOM are normal. Pupils are equal, round, and reactive to light.     Ears: Bilateral pearly white TMs.    Neck: Normal range of motion. Neck supple.     Cardiovascular: Normal rate, regular rhythm and normal heart sounds.      Pulmonary/Chest: Normal effort  and breath sounds normal.     Abdominal: LUQ and left flank tenderness to palpation. " Soft. Bowel sounds are normal.    Musculoskeletal: Normal range of motion.     Neurological: No focal deficits.    Lymphatics: No edema.    Skin: Skin is warm and dry.     Psychiatric: Normal mood and affect. Behavior is normal.       LAB:     All pertinent labs reviewed and interpreted.  Labs Ordered and Resulted from Time of ED Arrival to Time of ED Departure   CBC WITH PLATELETS AND DIFFERENTIAL - Abnormal       Result Value    WBC Count 13.5 (*)     RBC Count 4.89      Hemoglobin 15.5      Hematocrit 46.0      MCV 94      MCH 31.7      MCHC 33.7      RDW 12.3      Platelet Count 272      % Neutrophils 64      % Lymphocytes 24      % Monocytes 8      % Eosinophils 3      % Basophils 0      % Immature Granulocytes 1      NRBCs per 100 WBC 0      Absolute Neutrophils 8.7 (*)     Absolute Lymphocytes 3.2      Absolute Monocytes 1.1      Absolute Eosinophils 0.4      Absolute Basophils 0.1      Absolute Immature Granulocytes 0.1      Absolute NRBCs 0.0     BASIC METABOLIC PANEL - Normal    Sodium 141      Potassium 4.6      Chloride 101      Carbon Dioxide (CO2) 29      Anion Gap 11      Urea Nitrogen 11      Creatinine 0.85      Calcium 9.1      Glucose 111      GFR Estimate >90     HEPATIC FUNCTION PANEL - Normal    Bilirubin Total 0.5      Bilirubin Direct 0.1      Protein Total 7.3      Albumin 3.7      Alkaline Phosphatase 87      AST 18      ALT 28     INR - Normal    INR 1.06     PARTIAL THROMBOPLASTIN TIME - Normal    aPTT 29     TROPONIN I - Normal    Troponin I 0.02     TYPE AND SCREEN, ADULT    ABO/RH(D) A POS      Antibody Screen Negative      SPECIMEN EXPIRATION DATE [id number]     ABO/RH TYPE AND SCREEN        RADIOLOGY:     Reviewed all pertinent imaging. Please see official radiology report.  CT Abdomen Pelvis w Contrast   Final Result   IMPRESSION:       1.  No acute pulmonary embolism.   2.  Subsegmental airway material in the lower lobes and right middle lobe greatest in the left lower lobe  where there is a small amount of postobstructive atelectasis. Retained secretions versus aspiration.   3.  Acute, moderately displaced fracture of the left ninth costal cartilage with surrounding soft tissue edema.   4.  No abnormality of the spleen.   5.  Hepatic steatosis.      CT Chest Pulmonary Embolism w Contrast   Final Result   IMPRESSION:       1.  No acute pulmonary embolism.   2.  Subsegmental airway material in the lower lobes and right middle lobe greatest in the left lower lobe where there is a small amount of postobstructive atelectasis. Retained secretions versus aspiration.   3.  Acute, moderately displaced fracture of the left ninth costal cartilage with surrounding soft tissue edema.   4.  No abnormality of the spleen.   5.  Hepatic steatosis.           EKG:       I have independently reviewed and interpreted the EKG(s) documented above.      PROCEDURES:     Procedures      I, Willy Wray, am serving as a scribe to document services personally performed by Dr. Hernandez based on my observation and the provider's statements to me. I, Flory Hernandez MD attest that Willy Wray is acting in a scribe capacity, has observed my performance of the services and has documented them in accordance with my direction.    Flory Hernandez M.D.  Emergency Medicine  [facility] EMERGENCY DEPARTMENT  [address] [facility] [id number] [phone]  Dept: [phone]     Flory Hernandez MD  12/31/21 3030

## 2021-12-31 NOTE — ED TRIAGE NOTES
Patient presents to ED for evaluation of left sided abdominal/flank pain for past 4 days. Also cough, 2 home COVID tests negative. Rates pain 10/10

## 2021-12-31 NOTE — TELEPHONE ENCOUNTER
"Patient calling in today stating  Cough and cold.   Was tested for COVID twice  Pain in the left chest and not able to lay down.   He thinks he tore a muscle in his ribs form coughing.   Left side of ribcage into stomach and left side of the upper back  NO fever.   Pain in stomach and back 10/10 with coughing or moving.   Feels a pop in the back with coughing.   Burning pain in the left side of the abdomen and pressure \"like its going to explode\"  Pain in left arm and left leg. Pain that radiated into the left neck.   Almost passed out from pain with coughing.   Pain with breathing in and stomach feels tight.   Per RN protocol patient should be seen in the ER, wife will drive the patient.     COVID 19 Nurse Triage Plan/Patient Instructions    Please be aware that novel coronavirus (COVID-19) may be circulating in the community. If you develop symptoms such as fever, cough, or SOB or if you have concerns about the presence of another infection including coronavirus (COVID-19), please contact your health care provider or visit [email or web address].     Disposition/Instructions    ED Visit recommended. Follow protocol based instructions.     Bring Your Own Device:  Please also bring your smart device(s) (smart phones, tablets, laptops) and their charging cables for your personal use and to communicate with your care team during your visit.    Thank you for taking steps to prevent the spread of this virus.  o Limit your contact with others.  o Wear a simple mask to cover your cough.  o Wash your hands well and often.    Resources    [facility]: About COVID-19: [email or web address]    CDC: What to Do If You're Sick: www.cdc.gov/coronavirus/2019-ncov/about/steps-when-sick.html    CDC: Ending Home Isolation: www.cdc.gov/coronavirus/2019-ncov/hcp/disposition-in-home-patients.html     CDC: Caring for Someone: www.cdc.gov/coronavirus/2019-ncov/if-you-are-sick/care-for-someone.html     [facility]: Interim " Guidance for Hospital Discharge to Home: www.health.[facility].[place]/diseases/coronavirus/hcp/hospdischarge.pdf    [facility] clinical trials (COVID-19 research studies): [email or web address]     Below are the COVID-19 hotlines at the [place] Department of Health ([facility]). Interpreters are available.   o For health questions: Call 248-253-0926 or 1-225.770.6809 (7 a.m. to 7 p.m.)  o For questions about schools and childcare: Call 937-144-8538 or 1-647.467.8685 (7 a.m. to 7 p.m.)                     Reason for Disposition    SEVERE abdominal pain (e.g., excruciating)    SEVERE chest pain    Pain also in shoulder(s) or arm(s) or jaw    Additional Information    Negative: Passed out (i.e., fainted, collapsed and was not responding)    Negative: Shock suspected (e.g., cold/pale/clammy skin, too weak to stand, low BP, rapid pulse)    Negative: Sounds like a life-threatening emergency to the triager    Negative: Chest pain    Negative: Pain is mainly in upper abdomen (if needed ask: 'is it mainly above the belly button?')    Negative: Severe difficulty breathing (e.g., struggling for each breath, speaks in single words)    Negative: Passed out (i.e., fainted, collapsed and was not responding)    Negative: Difficult to awaken or acting confused (e.g., disoriented, slurred speech)    Negative: Shock suspected (e.g., cold/pale/clammy skin, too weak to stand, low BP, rapid pulse)    Negative: Chest pain lasting longer than 5 minutes and ANY of the following:* Over 45 years old* Over 30 years old and at least one cardiac risk factor (e.g., diabetes, high blood pressure, high cholesterol, smoker, or strong family history of heart disease)* History of heart disease (i.e., angina, heart attack, heart failure, bypass surgery, takes nitroglycerin)* Pain is crushing, pressure-like, or heavy    Negative: Heart beating < 50 beats per minute OR > 140 beats per minute    Negative: Visible sweat on face or  sweat dripping down face    Negative: Sounds like a life-threatening emergency to the triager    Negative: Followed an injury to chest    Protocols used: ABDOMINAL PAIN - MALE-A-OH, CHEST PAIN-A-OH

## 2022-01-04 ENCOUNTER — TELEPHONE (OUTPATIENT)
Dept: PEDIATRICS | Facility: CLINIC | Age: 52
End: 2022-01-04

## 2022-01-04 ENCOUNTER — VIRTUAL VISIT (OUTPATIENT)
Dept: PEDIATRICS | Facility: CLINIC | Age: 52
End: 2022-01-04
Payer: COMMERCIAL

## 2022-01-04 DIAGNOSIS — S22.32XD OPEN FRACTURE OF ONE RIB OF LEFT SIDE WITH ROUTINE HEALING, SUBSEQUENT ENCOUNTER: Primary | ICD-10-CM

## 2022-01-04 PROCEDURE — 99214 OFFICE O/P EST MOD 30 MIN: CPT | Mod: TEL | Performed by: INTERNAL MEDICINE

## 2022-01-04 RX ORDER — LIDOCAINE 50 MG/G
1 PATCH TOPICAL EVERY 24 HOURS
Qty: 30 PATCH | Refills: 0 | Status: SHIPPED | OUTPATIENT
Start: 2022-01-04 | End: 2022-02-03

## 2022-01-04 RX ORDER — OXYCODONE HYDROCHLORIDE 5 MG/1
5 TABLET ORAL EVERY 6 HOURS PRN
Qty: 20 TABLET | Refills: 0 | Status: SHIPPED | OUTPATIENT
Start: 2022-01-04 | End: 2023-06-22

## 2022-01-04 NOTE — TELEPHONE ENCOUNTER
Central Prior Authorization Team   Phone: [phone]      PA Initiation    Medication: lidocaine (LIDODERM) 5 % patch-Initiated  Insurance Company: "[employer] - Phone [phone] Fax [phone]  Pharmacy Filling the Rx: [id number] [place] [id number] AT [facility] OF [address] [place] [address]  Filling Pharmacy Phone: [phone]  Filling Pharmacy Fax:    Start Date: 1/4/2022

## 2022-01-04 NOTE — PROGRESS NOTES
"Peter is a 51 year old who is being evaluated via a billable telephone visit.      What phone number would you like to be contacted at? .  How would you like to obtain your AVS? MyChart    Assessment & Plan     Open fracture of one rib of left side with routine healing, subsequent encounter  Secondary to coughing from pneumonia. Discussed gradual improvement and pain control in the meantime. Reminded to schedule tylenol, use lidocaine patches, and oxycodone for breakthrough pain.   - lidocaine (LIDODERM) 5 % patch; Place 1 patch onto the skin every 24 hours To prevent lidocaine toxicity, patient should be patch free for 12 hrs daily.  - oxyCODONE (ROXICODONE) 5 MG tablet; Take 1 tablet (5 mg) by mouth every 6 hours as needed for pain  - diclofenac (VOLTAREN) 1 % topical gel; Apply 4 g topically 4 times daily             BMI:   Estimated body mass index is 41.61 kg/m  as calculated from the following:    Height as of 12/31/21: 1.778 m (5' 10\").    Weight as of 12/31/21: 131.5 kg (290 lb).   Weight management plan: not discussed        Return in about 1 week (around 1/11/2022) for if symptoms worsen, or as needed.    Virginia June MD  [facility]    Rubens Green is a 51 year old who presents for the following health issues     HPI     ED follow up. He had a bad cough, and had severe LUQ pain that started last Thursday.  In the ED, CT  showed moderately displaced fracture of the left ninth costal cartilage with surrounding soft tissue edema. Also diagnosed with pneumonia.     He was treated with augmentin for pneumonia, and oxycodone for pain. He continues to have severe pain when coughing. He is sleeping sitting up in his chair, but not sleeping well at all. The pain is generally manageable when he is sitting, but excruciating with a cough. He continues to have cough productive of yellow-green sputum and shortness of breath, especially when lying down. In general, the cough and shortness " of breath are a bit better since starting antibiotics.             Review of Systems   Constitutional, HEENT, cardiovascular, pulmonary, gi and gu systems are negative, except as otherwise noted.      Objective           Vitals:  No vitals were obtained today due to virtual visit.    Physical Exam   healthy, alert and no distress  PSYCH: Alert and oriented times 3; coherent speech, normal   rate and volume, able to articulate logical thoughts, able   to abstract reason, no tangential thoughts, no hallucinations   or delusions  His affect is normal and pleasant  RESP: No cough, no audible wheezing, able to talk in full sentences  Remainder of exam unable to be completed due to telephone visits                Phone call duration: 18 minutes

## 2022-01-04 NOTE — PATIENT INSTRUCTIONS
Continue to take tylenol 1000 mg every 6-8 hours.     Use the lidocaine patch on for 12 hours, off for 12 hours.     Use the diclofenac gel as needed on sore spots.     Take the oxycodone as needed for breakthrough pain.     If you are still significantly short of breath next week, call clinic to follow up.

## 2022-01-05 NOTE — TELEPHONE ENCOUNTER
Please contact patient and see if he got lidocaine patches- looks like insurance denied   If they did not- how are his symptoms

## 2022-01-05 NOTE — TELEPHONE ENCOUNTER
PRIOR AUTHORIZATION DENIED    Medication: lidocaine (LIDODERM) 5 % patch-DENIED    Denial Date: 1/5/2022    Denial Rational: Criteria not met        Appeal Information:

## 2022-01-05 NOTE — TELEPHONE ENCOUNTER
Spoke to pt and he said he just bought the patches OTC. He said symptoms are improving and cough is getting better

## 2022-01-17 DIAGNOSIS — I10 ESSENTIAL HYPERTENSION: ICD-10-CM

## 2022-01-18 RX ORDER — LOSARTAN POTASSIUM 25 MG/1
TABLET ORAL
Qty: 90 TABLET | Refills: 1 | Status: SHIPPED | OUTPATIENT
Start: 2022-01-18 | End: 2022-07-29

## 2022-03-21 DIAGNOSIS — R80.9 TYPE 2 DIABETES MELLITUS WITH MICROALBUMINURIA, WITHOUT LONG-TERM CURRENT USE OF INSULIN (H): ICD-10-CM

## 2022-03-21 DIAGNOSIS — E11.29 TYPE 2 DIABETES MELLITUS WITH MICROALBUMINURIA, WITHOUT LONG-TERM CURRENT USE OF INSULIN (H): ICD-10-CM

## 2022-03-22 RX ORDER — METFORMIN HCL 500 MG
TABLET, EXTENDED RELEASE 24 HR ORAL
Qty: 360 TABLET | Refills: 0 | Status: SHIPPED | OUTPATIENT
Start: 2022-03-22 | End: 2022-06-20

## 2022-03-22 NOTE — TELEPHONE ENCOUNTER
"Routing refill request to provider for review/approval because:  Patient needs to be seen because:  It has been greater than 6 months since her labs    Last Written Prescription Date:  12/22/2021  Last Fill Quantity: 180,  # refills: 0   Last office visit provider:  1/4/2022, 5/20/2021     Requested Prescriptions   Pending Prescriptions Disp Refills     metFORMIN (GLUCOPHAGE-XR) 500 MG 24 hr tablet [Pharmacy Med Name: METFORMIN ER 500MG 24HR TABS] 360 tablet      Sig: TAKE 2 TABLETS(1000 MG) BY MOUTH TWICE DAILY       Biguanide Agents Failed - 3/21/2022 11:01 AM        Failed - Patient has documented A1c within the specified period of time.     If HgbA1C is 8 or greater, it needs to be on file within the past 3 months.  If less than 8, must be on file within the past 6 months.     Recent Labs   Lab Test 03/04/21  0948   A1C 7.4*             Failed - Recent (6 mo) or future (30 days) visit within the authorizing provider's specialty     Patient had office visit in the last 6 months or has a visit in the next 30 days with authorizing provider or within the authorizing provider's specialty.  See \"Patient Info\" tab in inbasket, or \"Choose Columns\" in Meds & Orders section of the refill encounter.            Passed - Patient is age 10 or older        Passed - Patient's CR is NOT>1.4 OR Patient's EGFR is NOT<45 within past 12 mos.     Recent Labs   Lab Test 12/31/21  1202 04/05/21  0914   GFRESTIMATED >90 >60   GFRESTBLACK  --  >60       Recent Labs   Lab Test 12/31/21  1202   CR 0.85             Passed - Patient does NOT have a diagnosis of CHF.        Passed - Medication is active on med list             Kath Phelps RN 03/22/22 11:43 AM  "

## 2022-04-02 ENCOUNTER — HEALTH MAINTENANCE LETTER (OUTPATIENT)
Age: 52
End: 2022-04-02

## 2022-04-15 DIAGNOSIS — E11.9 TYPE 2 DIABETES MELLITUS WITHOUT COMPLICATION, WITHOUT LONG-TERM CURRENT USE OF INSULIN (H): ICD-10-CM

## 2022-04-15 RX ORDER — ASPIRIN 81 MG/1
TABLET, COATED ORAL
Qty: 150 TABLET | Refills: 2 | Status: SHIPPED | OUTPATIENT
Start: 2022-04-15 | End: 2023-07-10

## 2022-07-23 ENCOUNTER — HEALTH MAINTENANCE LETTER (OUTPATIENT)
Age: 52
End: 2022-07-23

## 2022-10-01 ENCOUNTER — HEALTH MAINTENANCE LETTER (OUTPATIENT)
Age: 52
End: 2022-10-01

## 2022-10-03 DIAGNOSIS — E11.29 TYPE 2 DIABETES MELLITUS WITH MICROALBUMINURIA, WITHOUT LONG-TERM CURRENT USE OF INSULIN (H): ICD-10-CM

## 2022-10-03 DIAGNOSIS — I10 ESSENTIAL HYPERTENSION: ICD-10-CM

## 2022-10-03 DIAGNOSIS — R80.9 TYPE 2 DIABETES MELLITUS WITH MICROALBUMINURIA, WITHOUT LONG-TERM CURRENT USE OF INSULIN (H): ICD-10-CM

## 2022-10-03 RX ORDER — METFORMIN HCL 500 MG
TABLET, EXTENDED RELEASE 24 HR ORAL
Qty: 120 TABLET | Refills: 0 | Status: SHIPPED | OUTPATIENT
Start: 2022-10-03 | End: 2022-10-04

## 2022-10-03 RX ORDER — LOSARTAN POTASSIUM 25 MG/1
TABLET ORAL
Qty: 30 TABLET | Refills: 0 | Status: SHIPPED | OUTPATIENT
Start: 2022-10-03 | End: 2022-10-04

## 2022-10-03 NOTE — TELEPHONE ENCOUNTER
First Attempt: I sent a my chart message to the patient to please contact our office to schedule a diabetic check appt.

## 2022-10-03 NOTE — TELEPHONE ENCOUNTER
"Routing refill request to provider for review/approval because:  Patient needs to be seen because it has been more than 1 year since last office visit.    Last Written Prescription Date:  7/29/22  Last Fill Quantity: 30,  # refills: 0   Last office visit provider:  4/5/21     Requested Prescriptions   Pending Prescriptions Disp Refills     losartan (COZAAR) 25 MG tablet [Pharmacy Med Name: LOSARTAN 25MG TABLETS] 30 tablet 0     Sig: TAKE 1 TABLET(25 MG) BY MOUTH DAILY       Angiotensin-II Receptors Failed - 10/3/2022  8:26 AM        Failed - Last blood pressure under 140/90 in past 12 months     BP Readings from Last 3 Encounters:   12/31/21 (!) 141/90   04/05/21 (!) 141/90   03/04/21 (!) 157/104                 Failed - Recent (12 mo) or future (30 days) visit within the authorizing provider's specialty     Patient has had an office visit with the authorizing provider or a provider within the authorizing providers department within the previous 12 mos or has a future within next 30 days. See \"Patient Info\" tab in inbasket, or \"Choose Columns\" in Meds & Orders section of the refill encounter.              Passed - Medication is active on med list        Passed - Patient is age 18 or older        Passed - Normal serum creatinine on file in past 12 months     Recent Labs   Lab Test 12/31/21  1202   CR 0.85       Ok to refill medication if creatinine is low          Passed - Normal serum potassium on file in past 12 months     Recent Labs   Lab Test 12/31/21  1202   POTASSIUM 4.6                       metFORMIN (GLUCOPHAGE XR) 500 MG 24 hr tablet [Pharmacy Med Name: METFORMIN ER 500MG 24HR TABS] 120 tablet 0     Sig: TAKE 2 TABLETS(1000 MG) BY MOUTH TWICE DAILY       Biguanide Agents Failed - 10/3/2022  8:26 AM        Failed - Patient has documented A1c within the specified period of time.     If HgbA1C is 8 or greater, it needs to be on file within the past 3 months.  If less than 8, must be on file within the past 6 " "months.     Recent Labs   Lab Test 03/04/21  0948   A1C 7.4*             Failed - Recent (6 mo) or future (30 days) visit within the authorizing provider's specialty     Patient had office visit in the last 6 months or has a visit in the next 30 days with authorizing provider or within the authorizing provider's specialty.  See \"Patient Info\" tab in inbasket, or \"Choose Columns\" in Meds & Orders section of the refill encounter.            Passed - Patient is age 10 or older        Passed - Patient's CR is NOT>1.4 OR Patient's EGFR is NOT<45 within past 12 mos.     Recent Labs   Lab Test 12/31/21  1202 04/05/21  0914   GFRESTIMATED >90 >60   GFRESTBLACK  --  >60       Recent Labs   Lab Test 12/31/21  1202   CR 0.85             Passed - Patient does NOT have a diagnosis of CHF.        Passed - Medication is active on med list             Trudy Hernandez RN 10/03/22 1:56 PM  "

## 2022-10-04 DIAGNOSIS — R80.9 TYPE 2 DIABETES MELLITUS WITH MICROALBUMINURIA, WITHOUT LONG-TERM CURRENT USE OF INSULIN (H): ICD-10-CM

## 2022-10-04 DIAGNOSIS — E11.29 TYPE 2 DIABETES MELLITUS WITH MICROALBUMINURIA, WITHOUT LONG-TERM CURRENT USE OF INSULIN (H): ICD-10-CM

## 2022-10-04 DIAGNOSIS — I10 ESSENTIAL HYPERTENSION: ICD-10-CM

## 2022-10-04 RX ORDER — METFORMIN HCL 500 MG
TABLET, EXTENDED RELEASE 24 HR ORAL
Qty: 360 TABLET | OUTPATIENT
Start: 2022-10-04

## 2022-10-04 RX ORDER — METFORMIN HCL 500 MG
TABLET, EXTENDED RELEASE 24 HR ORAL
Qty: 120 TABLET | Refills: 0 | Status: SHIPPED | OUTPATIENT
Start: 2022-10-04 | End: 2023-06-22

## 2022-10-04 RX ORDER — LOSARTAN POTASSIUM 25 MG/1
TABLET ORAL
Qty: 30 TABLET | Refills: 0 | Status: SHIPPED | OUTPATIENT
Start: 2022-10-04 | End: 2023-04-13

## 2022-10-04 RX ORDER — LOSARTAN POTASSIUM 25 MG/1
TABLET ORAL
Qty: 90 TABLET | OUTPATIENT
Start: 2022-10-04

## 2022-10-04 NOTE — TELEPHONE ENCOUNTER
"Routing refill request to provider for review/approval because:  Patient needs to be seen because it has been more than 1 year since last office visit.  BP not in range.  Patient requesting 90 day supply.  Outside protocol window to alter this script.    Last Written Prescription Date:  10/3/22  Last Fill Quantity: 30/120,  # refills: 0   Last office visit provider:  4/5/21     Requested Prescriptions   Pending Prescriptions Disp Refills     losartan (COZAAR) 25 MG tablet [Pharmacy Med Name: LOSARTAN 25MG TABLETS] 90 tablet      Sig: TAKE 1 TABLET(25 MG) BY MOUTH DAILY       Angiotensin-II Receptors Failed - 10/4/2022  9:45 AM        Failed - Last blood pressure under 140/90 in past 12 months     BP Readings from Last 3 Encounters:   12/31/21 (!) 141/90   04/05/21 (!) 141/90   03/04/21 (!) 157/104                 Failed - Recent (12 mo) or future (30 days) visit within the authorizing provider's specialty     Patient has had an office visit with the authorizing provider or a provider within the authorizing providers department within the previous 12 mos or has a future within next 30 days. See \"Patient Info\" tab in inbasket, or \"Choose Columns\" in Meds & Orders section of the refill encounter.              Passed - Medication is active on med list        Passed - Patient is age 18 or older        Passed - Normal serum creatinine on file in past 12 months     Recent Labs   Lab Test 12/31/21  1202   CR 0.85       Ok to refill medication if creatinine is low          Passed - Normal serum potassium on file in past 12 months     Recent Labs   Lab Test 12/31/21  1202   POTASSIUM 4.6                       metFORMIN (GLUCOPHAGE XR) 500 MG 24 hr tablet [Pharmacy Med Name: METFORMIN ER 500MG 24HR TABS] 360 tablet      Sig: TAKE 2 TABLETS(1000 MG) BY MOUTH TWICE DAILY       Biguanide Agents Failed - 10/4/2022  9:45 AM        Failed - Patient has documented A1c within the specified period of time.     If HgbA1C is 8 or " "greater, it needs to be on file within the past 3 months.  If less than 8, must be on file within the past 6 months.     Recent Labs   Lab Test 03/04/21  0948   A1C 7.4*             Failed - Recent (6 mo) or future (30 days) visit within the authorizing provider's specialty     Patient had office visit in the last 6 months or has a visit in the next 30 days with authorizing provider or within the authorizing provider's specialty.  See \"Patient Info\" tab in inbasket, or \"Choose Columns\" in Meds & Orders section of the refill encounter.            Passed - Patient is age 10 or older        Passed - Patient's CR is NOT>1.4 OR Patient's EGFR is NOT<45 within past 12 mos.     Recent Labs   Lab Test 12/31/21  1202 04/05/21  0914   GFRESTIMATED >90 >60   GFRESTBLACK  --  >60       Recent Labs   Lab Test 12/31/21  1202   CR 0.85             Passed - Patient does NOT have a diagnosis of CHF.        Passed - Medication is active on med list             Jacoby Heath RN 10/04/22 9:45 AM  "

## 2023-02-04 ENCOUNTER — HEALTH MAINTENANCE LETTER (OUTPATIENT)
Age: 53
End: 2023-02-04

## 2023-04-13 DIAGNOSIS — I10 ESSENTIAL HYPERTENSION: ICD-10-CM

## 2023-04-14 RX ORDER — LOSARTAN POTASSIUM 25 MG/1
25 TABLET ORAL DAILY
Qty: 90 TABLET | Refills: 0 | Status: SHIPPED | OUTPATIENT
Start: 2023-04-14 | End: 2023-06-22

## 2023-04-14 NOTE — TELEPHONE ENCOUNTER
"Routing refill request to provider for review/approval because:  Labs not current:  Creatinint  A break in medication  Patient needs to be seen because it has been more than 1 year since last office visit.  BP    Last Written Prescription Date:  10/4/22  Last Fill Quantity: 30,  # refills: 0   Last office visit provider:   1/4/22    Requested Prescriptions   Pending Prescriptions Disp Refills     losartan (COZAAR) 25 MG tablet 30 tablet 0     Sig: Take 1 tablet (25 mg) by mouth daily       Angiotensin-II Receptors Failed - 4/14/2023  1:45 PM        Failed - Last blood pressure under 140/90 in past 12 months     BP Readings from Last 3 Encounters:   12/31/21 (!) 141/90   04/05/21 (!) 141/90   03/04/21 (!) 157/104                 Failed - Recent (12 mo) or future (30 days) visit within the authorizing provider's specialty     Patient has had an office visit with the authorizing provider or a provider within the authorizing providers department within the previous 12 mos or has a future within next 30 days. See \"Patient Info\" tab in inbasket, or \"Choose Columns\" in Meds & Orders section of the refill encounter.              Failed - Normal serum creatinine on file in past 12 months     Recent Labs   Lab Test 12/31/21  1202   CR 0.85       Ok to refill medication if creatinine is low          Failed - Normal serum potassium on file in past 12 months     Recent Labs   Lab Test 12/31/21  1202   POTASSIUM 4.6                    Passed - Medication is active on med list        Passed - Patient is age 18 or older             CHON SIN RN 04/14/23 1:46 PM  "

## 2023-04-20 ENCOUNTER — TRANSFERRED RECORDS (OUTPATIENT)
Dept: HEALTH INFORMATION MANAGEMENT | Facility: CLINIC | Age: 53
End: 2023-04-20
Payer: COMMERCIAL

## 2023-04-20 LAB — RETINOPATHY: NEGATIVE

## 2023-05-13 ENCOUNTER — HEALTH MAINTENANCE LETTER (OUTPATIENT)
Age: 53
End: 2023-05-13

## 2023-06-22 ENCOUNTER — OFFICE VISIT (OUTPATIENT)
Dept: FAMILY MEDICINE | Facility: CLINIC | Age: 53
End: 2023-06-22
Payer: COMMERCIAL

## 2023-06-22 ENCOUNTER — LAB (OUTPATIENT)
Dept: LAB | Facility: CLINIC | Age: 53
End: 2023-06-22
Payer: COMMERCIAL

## 2023-06-22 VITALS
BODY MASS INDEX: 45.1 KG/M2 | OXYGEN SATURATION: 94 % | HEART RATE: 95 BPM | SYSTOLIC BLOOD PRESSURE: 148 MMHG | HEIGHT: 70 IN | RESPIRATION RATE: 16 BRPM | WEIGHT: 315 LBS | TEMPERATURE: 98 F | DIASTOLIC BLOOD PRESSURE: 106 MMHG

## 2023-06-22 DIAGNOSIS — M79.671 PAIN OF RIGHT HEEL: Primary | ICD-10-CM

## 2023-06-22 DIAGNOSIS — G62.9 NEUROPATHY: ICD-10-CM

## 2023-06-22 DIAGNOSIS — I10 ESSENTIAL HYPERTENSION: ICD-10-CM

## 2023-06-22 DIAGNOSIS — I10 ESSENTIAL HYPERTENSION, BENIGN: ICD-10-CM

## 2023-06-22 DIAGNOSIS — M77.30 CALCANEAL SPUR, UNSPECIFIED LATERALITY: ICD-10-CM

## 2023-06-22 DIAGNOSIS — E11.9 TYPE 2 DIABETES MELLITUS WITHOUT COMPLICATION, WITHOUT LONG-TERM CURRENT USE OF INSULIN (H): ICD-10-CM

## 2023-06-22 DIAGNOSIS — R06.83 SNORING: ICD-10-CM

## 2023-06-22 DIAGNOSIS — E11.29 TYPE 2 DIABETES MELLITUS WITH MICROALBUMINURIA, WITHOUT LONG-TERM CURRENT USE OF INSULIN (H): ICD-10-CM

## 2023-06-22 DIAGNOSIS — R80.9 TYPE 2 DIABETES MELLITUS WITH MICROALBUMINURIA, WITHOUT LONG-TERM CURRENT USE OF INSULIN (H): ICD-10-CM

## 2023-06-22 LAB
ALBUMIN SERPL BCG-MCNC: 4.3 G/DL (ref 3.5–5.2)
ALP SERPL-CCNC: 102 U/L (ref 40–129)
ALT SERPL W P-5'-P-CCNC: 36 U/L (ref 0–70)
ANION GAP SERPL CALCULATED.3IONS-SCNC: 10 MMOL/L (ref 7–15)
AST SERPL W P-5'-P-CCNC: 30 U/L (ref 0–45)
BASOPHILS # BLD AUTO: 0 10E3/UL (ref 0–0.2)
BASOPHILS NFR BLD AUTO: 0 %
BILIRUB SERPL-MCNC: 0.2 MG/DL
BUN SERPL-MCNC: 15.8 MG/DL (ref 6–20)
CALCIUM SERPL-MCNC: 9.7 MG/DL (ref 8.6–10)
CHLORIDE SERPL-SCNC: 102 MMOL/L (ref 98–107)
CHOLEST SERPL-MCNC: 245 MG/DL
CREAT SERPL-MCNC: 0.94 MG/DL (ref 0.67–1.17)
CREAT UR-MCNC: 131.7 MG/DL
DEPRECATED HCO3 PLAS-SCNC: 30 MMOL/L (ref 22–29)
EOSINOPHIL # BLD AUTO: 0.6 10E3/UL (ref 0–0.7)
EOSINOPHIL NFR BLD AUTO: 5 %
ERYTHROCYTE [DISTWIDTH] IN BLOOD BY AUTOMATED COUNT: 12.5 % (ref 10–15)
GFR SERPL CREATININE-BSD FRML MDRD: >90 ML/MIN/1.73M2
GLUCOSE SERPL-MCNC: 166 MG/DL (ref 70–99)
HBA1C MFR BLD: 7.3 % (ref 0–5.6)
HCT VFR BLD AUTO: 48.9 % (ref 40–53)
HDLC SERPL-MCNC: 35 MG/DL
HGB BLD-MCNC: 16.2 G/DL (ref 13.3–17.7)
IMM GRANULOCYTES # BLD: 0.1 10E3/UL
IMM GRANULOCYTES NFR BLD: 1 %
LDLC SERPL CALC-MCNC: ABNORMAL MG/DL
LYMPHOCYTES # BLD AUTO: 3.2 10E3/UL (ref 0.8–5.3)
LYMPHOCYTES NFR BLD AUTO: 27 %
MCH RBC QN AUTO: 31.7 PG (ref 26.5–33)
MCHC RBC AUTO-ENTMCNC: 33.1 G/DL (ref 31.5–36.5)
MCV RBC AUTO: 96 FL (ref 78–100)
MICROALBUMIN UR-MCNC: 67.4 MG/L
MICROALBUMIN/CREAT UR: 51.18 MG/G CR (ref 0–17)
MONOCYTES # BLD AUTO: 1.1 10E3/UL (ref 0–1.3)
MONOCYTES NFR BLD AUTO: 10 %
NEUTROPHILS # BLD AUTO: 6.8 10E3/UL (ref 1.6–8.3)
NEUTROPHILS NFR BLD AUTO: 58 %
NONHDLC SERPL-MCNC: 210 MG/DL
PLATELET # BLD AUTO: 212 10E3/UL (ref 150–450)
POTASSIUM SERPL-SCNC: 4.9 MMOL/L (ref 3.4–5.3)
PROT SERPL-MCNC: 7.8 G/DL (ref 6.4–8.3)
RBC # BLD AUTO: 5.11 10E6/UL (ref 4.4–5.9)
SODIUM SERPL-SCNC: 142 MMOL/L (ref 136–145)
TRIGL SERPL-MCNC: 537 MG/DL
TSH SERPL DL<=0.005 MIU/L-ACNC: 2.02 UIU/ML (ref 0.3–4.2)
VIT B12 SERPL-MCNC: 700 PG/ML (ref 232–1245)
WBC # BLD AUTO: 11.7 10E3/UL (ref 4–11)

## 2023-06-22 PROCEDURE — 82043 UR ALBUMIN QUANTITATIVE: CPT

## 2023-06-22 PROCEDURE — 80050 GENERAL HEALTH PANEL: CPT

## 2023-06-22 PROCEDURE — 80061 LIPID PANEL: CPT

## 2023-06-22 PROCEDURE — 82607 VITAMIN B-12: CPT

## 2023-06-22 PROCEDURE — 83036 HEMOGLOBIN GLYCOSYLATED A1C: CPT

## 2023-06-22 PROCEDURE — 99214 OFFICE O/P EST MOD 30 MIN: CPT | Performed by: FAMILY MEDICINE

## 2023-06-22 PROCEDURE — 83721 ASSAY OF BLOOD LIPOPROTEIN: CPT | Mod: 59

## 2023-06-22 PROCEDURE — 36415 COLL VENOUS BLD VENIPUNCTURE: CPT

## 2023-06-22 PROCEDURE — 82570 ASSAY OF URINE CREATININE: CPT

## 2023-06-22 RX ORDER — LOSARTAN POTASSIUM 25 MG/1
25 TABLET ORAL DAILY
Qty: 90 TABLET | Refills: 0 | Status: SHIPPED | OUTPATIENT
Start: 2023-06-22 | End: 2023-09-11

## 2023-06-22 RX ORDER — METFORMIN HCL 500 MG
1000 TABLET, EXTENDED RELEASE 24 HR ORAL 2 TIMES DAILY
Qty: 120 TABLET | Refills: 0 | Status: SHIPPED | OUTPATIENT
Start: 2023-06-22 | End: 2023-07-19

## 2023-06-22 RX ORDER — ATORVASTATIN CALCIUM 20 MG/1
20 TABLET, FILM COATED ORAL DAILY
Qty: 90 TABLET | Refills: 3 | Status: SHIPPED | OUTPATIENT
Start: 2023-06-22 | End: 2023-06-26

## 2023-06-22 ASSESSMENT — PAIN SCALES - GENERAL: PAINLEVEL: EXTREME PAIN (8)

## 2023-06-22 NOTE — PROGRESS NOTES
Assessment & Plan       Type 2 diabetes mellitus without complication, without long-term current use of insulin (H)  We will update labs today.  I have refilled your metformin.  Depending on your lab results I will call you with additional recommendations.  - HEMOGLOBIN A1C  - Lipid panel reflex to direct LDL Non-fasting  - Albumin Random Urine Quantitative with Creat Ratio  - Adult Sleep Eval & Management  Referral    Essential hypertension  Blood pressure is elevated today.  Please restart your blood pressure medication.  Please check your blood pressure at home.  I would like you to follow-up in 2 to 4 weeks to see how your blood pressure is doing and if we need to make any adjustments  - losartan (COZAAR) 25 MG tablet  Dispense: 90 tablet; Refill: 0    Type 2 diabetes mellitus with microalbuminuria, without long-term current use of insulin (H)  Refills provided  - metFORMIN (GLUCOPHAGE XR) 500 MG 24 hr tablet  Dispense: 120 tablet; Refill: 0    Pain of right heel  - XR Foot Right G/E 3 Views    Snoring  Suspect untreated EFRAÍN  - Adult Sleep Eval & Management  Referral    Neuropathy  - CBC with platelets and differential  - Vitamin B12  - TSH with free T4 reflex  - Comprehensive metabolic panel (BMP + Alb, Alk Phos, ALT, AST, Total. Bili, TP)    Calcaneal spur, unspecified laterality  - Orthopedic  Referral        45 minutes spent by me on the date of the encounter doing review of outside records, review of test results, interpretation of tests, patient visit and documentation     NIECY MAR DO  [facility]    Rubens Green is a 53 year old, presenting for the following health issues:  Musculoskeletal Problem        6/22/2023     1:39 PM   Additional Questions   Roomed by Letha PEREA MA   Accompanied by Wife     Musculoskeletal Problem    History of Present Illness       Reason for visit:  Pain in heal of right foot and refill on some meds    He  "eats 0-1 servings of fruits and vegetables daily.He consumes 0 sweetened beverage(s) daily. He exercises with enough effort to increase his heart rate 5 days per week. He is missing 2 dose(s) of medications per week.  He is not taking prescribed medications regularly due to remembering to take.       Pain History:  When did you first notice your pain? 1 month   Have you seen anyone else for your pain? No  How has your pain affected your ability to work? Working, but difficult to  Where in your body do you have pain? Musculoskeletal problem/pain  Onset/Duration: 1 month, maybe longer  Description  Location: Heel - right  Joint Swelling: No  Redness: No  Pain: YES  Warmth: No  Intensity:  moderate, 8/10  Progression of Symptoms:  same  Accompanying signs and symptoms:   Fevers: No  Numbness/tingling/weakness: No  History  Trauma to the area: No  Recent illness:  No  Previous similar problem: No  Previous evaluation:  No  Precipitating or alleviating factors:  Aggravating factors include: standing, walking, climbing stairs and pressure  Therapies tried and outcome: ice    Owns a construction company.    Has been lost to follow-up for his chronic conditions.    Not watching diet or taking blood pressure at home.  Due to update labs.      Review of Systems   Constitutional, HEENT, cardiovascular, pulmonary, gi and gu systems are negative, except as otherwise noted.      Objective    BP (!) 148/106 (BP Location: Right arm, Patient Position: Chair, Cuff Size: Adult Large)   Pulse 95   Temp 98  F (36.7  C) (Tympanic)   Resp 16   Ht 1.778 m (5' 10\")   Wt 149 kg (328 lb 6.4 oz)   SpO2 94%   BMI 47.12 kg/m    Body mass index is 47.12 kg/m .  Physical Exam  Constitutional:       Appearance: Normal appearance.   HENT:      Head: Normocephalic.   Cardiovascular:      Rate and Rhythm: Normal rate and regular rhythm.      Pulses: Normal pulses.   Pulmonary:      Effort: Pulmonary effort is normal.   Abdominal:      General: " Bowel sounds are normal.   Musculoskeletal:         General: Tenderness present. Normal range of motion.      Right lower leg: No edema.      Left lower leg: No edema.      Comments: Tenderness to heel of foot, not to palpation only with ambulation   Neurological:      Mental Status: He is alert.   Psychiatric:         Thought Content: Thought content normal.         Judgment: Judgment normal.

## 2023-06-22 NOTE — PATIENT INSTRUCTIONS
Type 2 diabetes:  We will update labs today.  I have refilled your metformin.  Depending on your lab results I will call you with additional recommendations.    Hypertension:  Blood pressure is elevated today.  Please restart your blood pressure medication.  Please check your blood pressure at home.  I would like you to follow-up in 2 to 4 weeks to see how your blood pressure is doing and if we need to make any adjustments    X-ray for foot tomorrow.  I will let you know how that looks    Please schedule appointment with sleep medicine-can discuss screening for sleep apnea

## 2023-06-23 ENCOUNTER — ANCILLARY PROCEDURE (OUTPATIENT)
Dept: GENERAL RADIOLOGY | Facility: CLINIC | Age: 53
End: 2023-06-23
Attending: FAMILY MEDICINE
Payer: COMMERCIAL

## 2023-06-23 DIAGNOSIS — M79.671 PAIN OF RIGHT HEEL: ICD-10-CM

## 2023-06-23 LAB — LDLC SERPL DIRECT ASSAY-MCNC: 144 MG/DL

## 2023-06-23 PROCEDURE — 73630 X-RAY EXAM OF FOOT: CPT | Mod: TC | Performed by: RADIOLOGY

## 2023-06-26 DIAGNOSIS — E11.9 TYPE 2 DIABETES MELLITUS WITHOUT COMPLICATION, WITHOUT LONG-TERM CURRENT USE OF INSULIN (H): ICD-10-CM

## 2023-06-26 RX ORDER — ATORVASTATIN CALCIUM 40 MG/1
40 TABLET, FILM COATED ORAL DAILY
Qty: 90 TABLET | Refills: 3 | Status: SHIPPED | OUTPATIENT
Start: 2023-06-26 | End: 2024-07-01

## 2023-07-10 ENCOUNTER — OFFICE VISIT (OUTPATIENT)
Dept: PODIATRY | Facility: CLINIC | Age: 53
End: 2023-07-10
Attending: FAMILY MEDICINE
Payer: COMMERCIAL

## 2023-07-10 VITALS
SYSTOLIC BLOOD PRESSURE: 149 MMHG | HEART RATE: 99 BPM | BODY MASS INDEX: 45.1 KG/M2 | DIASTOLIC BLOOD PRESSURE: 92 MMHG | WEIGHT: 315 LBS | HEIGHT: 70 IN

## 2023-07-10 DIAGNOSIS — M77.30 CALCANEAL SPUR, UNSPECIFIED LATERALITY: ICD-10-CM

## 2023-07-10 DIAGNOSIS — M72.2 PLANTAR FASCIITIS, RIGHT: Primary | ICD-10-CM

## 2023-07-10 PROCEDURE — 99203 OFFICE O/P NEW LOW 30 MIN: CPT | Performed by: PODIATRIST

## 2023-07-10 RX ORDER — MELOXICAM 7.5 MG/1
7.5 TABLET ORAL DAILY
Qty: 30 TABLET | Refills: 1 | Status: SHIPPED | OUTPATIENT
Start: 2023-07-10

## 2023-07-10 NOTE — PROGRESS NOTES
"PATIENT HISTORY:  Peter Arora is a 53 year old male who presents to clinic in consultation at the request of  Vonda Garcia D.O. with a chief complaint of right heel spur.  The patient is seen with their wife.  The patient relates the pain is primarily located around the bottom of the heel.  Reports insidious onset without acute precipitating event. that has been going on for 2 month(s).  The patient has previously tried rolling his foot on a frozen water bottle with little relief.  Denies any prior history of similar issues..  The patient is currently employed as slef-employed .  Any previous notes and studies that pertain to the patient's condition were reviewed.    Pertinent medical, surgical and family history was reviewed in the [facility] chart.    Past Medical History:   Past Medical History:   Diagnosis Date    Carpal tunnel syndrome     Created by Conversion     Essential hypertension, benign     Created by Conversion     Hereditary and idiopathic peripheral neuropathy     Created by Conversion  Replacement Utility updated for latest IMO load    Obesity, unspecified     Created by Conversion     Other allergy, other than to medicinal agents     Created by Conversion        Medications:   Current Outpatient Medications:     meloxicam (MOBIC) 7.5 MG tablet, Take 1 tablet (7.5 mg) by mouth daily, Disp: 30 tablet, Rfl: 1    atorvastatin (LIPITOR) 40 MG tablet, Take 1 tablet (40 mg) by mouth daily, Disp: 90 tablet, Rfl: 3    losartan (COZAAR) 25 MG tablet, Take 1 tablet (25 mg) by mouth daily, Disp: 90 tablet, Rfl: 0    metFORMIN (GLUCOPHAGE XR) 500 MG 24 hr tablet, Take 2 tablets (1,000 mg) by mouth 2 times daily for 360 days, Disp: 360 tablet, Rfl: 3     Allergies:    Allergies   Allergen Reactions    Lisinopril Unknown     Cough       Vitals: BP (!) 149/92   Pulse 99   Ht 1.778 m (5' 10\")   Wt 148.8 kg (328 lb)   BMI 47.06 kg/m    BMI= Body mass index is 47.06 kg/m .    LOWER EXTREMITY PHYSICAL " EXAM    Dermatologic: Skin is intact to right lower extremity without significant lesions, rash or abrasion.        Vascular: DP & PT pulses are intact & regular on the right.   CFT and skin temperature is normal to the right lower extremity.     Neurologic: Lower extremity sensation is intact to light touch.  No evidence of weakness in the right lower extremity.        Musculoskeletal: Patient is ambulatory without assistive device or brace.  No gross ankle deformity noted.  No foot or ankle joint effusion is noted.  Noted pain on palpation on the plantar aspect of the right heel near the insertion point of the plantar fascia.  No surrounding erythema or edema noted.  Noted tight gastroc complex on the right.    Diagnostics: Recent radiographs included three views of the right foot demonstrating calcaneal spurring with no cortical erosions or periosteal elevation.  All joint margins appear stable.  There is no apparent fracture or tumor formation noted.  There is no evidence of foreign body.  The images were independently reviewed by myself along with the patient explaining the findings.      ASSESSMENT / PLAN:     ICD-10-CM    1. Plantar fasciitis, right  M72.2 Ankle/Foot Bracing Supplies DME Ankle Brace; Right     meloxicam (MOBIC) 7.5 MG tablet      2. Calcaneal spur, unspecified laterality  M77.30 Orthopedic  Referral          I have explained to Peter about the conditions.  We discussed the underlying contributing factors to the condition as well as treatment options along with expected length of recovery.  At this time, the patient was educated on the importance of offloading supportive shoes and other devices.  I demonstrated to the patient calf stretches to perform every hour daily until symptoms resolve.  After symptoms resolve, the patient was advised to perform the stretches 3 times daily to prevent future recurrence.  The patient was instructed to perform warm soaks with Epson salt after  which to also apply over-the-counter Voltaren gel to deeply massage the injured tissue.  The patient was instructed to do this on a daily basis until symptoms resolve.  The patient was fitted with a Tri-Lock ankle brace that will aid in offloading the tension forces to the soft tissues and prevent further inflammation.  To reduce the amount of current inflammation, the patient was prescribed Mobic 7.5 mg to be taken daily with food and instructed to stop taking if any stomach irritation or swelling in extremities are noted.  The patient may return in four weeks for reevaluation to determine if any further treatment will be needed.    Peter verbalized agreement with and understanding of the rational for the diagnosis and treatment plan.  All questions were answered to best of my ability and the patient's satisfaction. The patient was advised to contact the clinic with any questions that may arise after the clinic visit.      Disclaimer: This note consists of symbols derived from keyboarding, dictation and/or voice recognition software. As a result, there may be errors in the script that have gone undetected. Please consider this when interpreting information found in this chart.       YASMIN Gordon D.P.M., F.SMILEY.C.F.A.S.

## 2023-07-10 NOTE — NURSING NOTE
"Chief Complaint   Patient presents with     Consult     Right foot- spur       Initial BP (!) 149/92   Pulse 99   Ht 1.778 m (5' 10\")   Wt 148.8 kg (328 lb)   BMI 47.06 kg/m   Estimated body mass index is 47.06 kg/m  as calculated from the following:    Height as of this encounter: 1.778 m (5' 10\").    Weight as of this encounter: 148.8 kg (328 lb).  Medications and allergies reviewed.      Melinda PEREA MA    "

## 2023-07-10 NOTE — Clinical Note
7/10/2023         RE: Peter Arora  [address]  [place] [address]        Dear Colleague,    Thank you for referring your patient, Peter Arora, to the [facility]. Please see a copy of my visit note below.    PATIENT HISTORY:  Peter Arora is a 53 year old male who presents to clinic in consultation at the request of  Vonda Garcia D.O. with a chief complaint of right heel spur.  The patient is seen with their wife.  The patient relates the pain is primarily located around the bottom of the heel.  Reports insidious onset without acute precipitating event. that has been going on for 2 month(s).  The patient has previously tried rolling his foot on a frozen water bottle with little relief.  Denies any prior history of similar issues..  The patient is currently employed as slef-employed.  Any previous notes and studies that pertain to the patient's condition were reviewed.    Pertinent medical, surgical and family history was reviewed in the Epic chart.    Past Medical History:   Past Medical History:   Diagnosis Date     Carpal tunnel syndrome     Created by Conversion      Essential hypertension, benign     Created by Conversion      Hereditary and idiopathic peripheral neuropathy     Created by Conversion  Replacement Utility updated for latest IMO load     Obesity, unspecified     Created by Conversion      Other allergy, other than to medicinal agents     Created by Conversion        Medications:   Current Outpatient Medications:      ASPIRIN LOW DOSE 81 MG EC tablet, TAKE 1 TABLET BY MOUTH ONCE DAILY (Patient not taking: Reported on 6/22/2023), Disp: 150 tablet, Rfl: 2     atorvastatin (LIPITOR) 40 MG tablet, Take 1 tablet (40 mg) by mouth daily, Disp: 90 tablet, Rfl: 3     losartan (COZAAR) 25 MG tablet, Take 1 tablet (25 mg) by mouth daily, Disp: 90 tablet, Rfl: 0     metFORMIN (GLUCOPHAGE XR) 500 MG 24 hr tablet, Take 2 tablets (1,000 mg) by  mouth 2 times daily, Disp: 120 tablet, Rfl: 0     Allergies:    Allergies   Allergen Reactions     Lisinopril Unknown     Cough       Vitals: There were no vitals taken for this visit.  BMI= There is no height or weight on file to calculate BMI.    LOWER EXTREMITY PHYSICAL EXAM    Dermatologic: Skin is intact to {RIGHT /LEFT:816300} lower extremity without significant lesions, rash or abrasion.        Vascular: DP & PT pulses are intact & regular on the {RIGHT /LEFT:128891}.   CFT and skin temperature is normal to the {RIGHT /LEFT:490068} lower extremity.     Neurologic: Lower extremity sensation is intact to light touch.  No evidence of weakness in the {RIGHT /LEFT:764776} lower extremity.        Musculoskeletal: Patient is ambulatory without assistive device or brace.  No gross ankle deformity noted.  No foot or ankle joint effusion is noted.  Noted pain on palpation on the plantar aspect of the {RIGHT /LEFT:643120} heel near the insertion point of the plantar fascia.  No surrounding erythema or edema noted.  Noted tight gastroc complex on the {RIGHT /LEFT:751428}.    Diagnostics:  Radiographs included three views of the {RIGHT LEFT BOTH NO:134655} foot demonstrating *** no cortical erosions or periosteal elevation.  All joint margins appear stable.  There is no apparent fracture or tumor formation noted.  There is no evidence of foreign body.  The images were independently reviewed by myself along with the patient explaining the findings.      ASSESSMENT / PLAN:   No diagnosis found.    I have explained to Peter about the conditions.  We discussed the underlying contributing factors to the condition as well as treatment options along with expected length of recovery.  At this time, the patient was educated on the importance of offloading supportive shoes and other devices.  I demonstrated to the patient calf stretches to perform every hour daily until symptoms resolve.  After symptoms resolve, the patient was  advised to perform the stretches 3 times daily to prevent future recurrence.  The patient was instructed to perform warm soaks with Epson salt after which to also apply over-the-counter Voltaren gel to deeply massage the injured tissue.  The patient was instructed to do this on a daily basis until symptoms resolve.  The patient was fitted with a Dynaflex insert that will aid in offloading the tension forces to the soft tissues and prevent further inflammation.  To reduce the amount of current inflammation, the patient was prescribed Mobic 7.5 mg to be taken daily with food and instructed to stop taking if any stomach irritation or swelling in extremities are noted.  The patient may return in four weeks for reevaluation to determine if any further treatment will be needed.    Peter verbalized agreement with and understanding of the rational for the diagnosis and treatment plan.  All questions were answered to best of my ability and the patient's satisfaction. The patient was advised to contact the clinic with any questions that may arise after the clinic visit.      Disclaimer: This note consists of symbols derived from keyboarding, dictation and/or voice recognition software. As a result, there may be errors in the script that have gone undetected. Please consider this when interpreting information found in this chart.       YASMIN Gordon D.P.M., F.A.C.F.A.S.        Again, thank you for allowing me to participate in the care of your patient.        Sincerely,        Jerrod Gordon DPM

## 2023-07-10 NOTE — PATIENT INSTRUCTIONS

## 2023-07-19 ENCOUNTER — MYC MEDICAL ADVICE (OUTPATIENT)
Dept: FAMILY MEDICINE | Facility: CLINIC | Age: 53
End: 2023-07-19
Payer: COMMERCIAL

## 2023-07-19 DIAGNOSIS — E11.29 TYPE 2 DIABETES MELLITUS WITH MICROALBUMINURIA, WITHOUT LONG-TERM CURRENT USE OF INSULIN (H): ICD-10-CM

## 2023-07-19 DIAGNOSIS — R80.9 TYPE 2 DIABETES MELLITUS WITH MICROALBUMINURIA, WITHOUT LONG-TERM CURRENT USE OF INSULIN (H): ICD-10-CM

## 2023-07-20 RX ORDER — METFORMIN HCL 500 MG
1000 TABLET, EXTENDED RELEASE 24 HR ORAL 2 TIMES DAILY
Qty: 120 TABLET | Refills: 0 | Status: SHIPPED | OUTPATIENT
Start: 2023-07-20 | End: 2023-07-20

## 2023-08-02 ENCOUNTER — TELEPHONE (OUTPATIENT)
Dept: FAMILY MEDICINE | Facility: CLINIC | Age: 53
End: 2023-08-02
Payer: COMMERCIAL

## 2023-08-02 NOTE — TELEPHONE ENCOUNTER
Patient Quality Outreach    Patient is due for the following:   Diabetes -  BP Check, Diabetic Follow-Up Visit, and Foot Exam  Colon Cancer Screening    Next Steps:   Patient has upcoming appointment, these items will be addressed at that time.    Type of outreach:    Chart review performed, no outreach needed.      Questions for provider review:    None           Letha Gaffney, CMA

## 2023-08-23 ENCOUNTER — MYC MEDICAL ADVICE (OUTPATIENT)
Dept: FAMILY MEDICINE | Facility: CLINIC | Age: 53
End: 2023-08-23
Payer: COMMERCIAL

## 2023-08-27 DIAGNOSIS — E11.29 TYPE 2 DIABETES MELLITUS WITH MICROALBUMINURIA, WITHOUT LONG-TERM CURRENT USE OF INSULIN (H): ICD-10-CM

## 2023-08-27 DIAGNOSIS — R80.9 TYPE 2 DIABETES MELLITUS WITH MICROALBUMINURIA, WITHOUT LONG-TERM CURRENT USE OF INSULIN (H): ICD-10-CM

## 2023-08-28 RX ORDER — METFORMIN HCL 500 MG
1000 TABLET, EXTENDED RELEASE 24 HR ORAL 2 TIMES DAILY
Qty: 360 TABLET | Refills: 3 | OUTPATIENT
Start: 2023-08-28

## 2023-09-11 ENCOUNTER — OFFICE VISIT (OUTPATIENT)
Dept: FAMILY MEDICINE | Facility: CLINIC | Age: 53
End: 2023-09-11
Payer: COMMERCIAL

## 2023-09-11 VITALS
TEMPERATURE: 97.6 F | SYSTOLIC BLOOD PRESSURE: 156 MMHG | RESPIRATION RATE: 16 BRPM | BODY MASS INDEX: 44.61 KG/M2 | WEIGHT: 311.6 LBS | DIASTOLIC BLOOD PRESSURE: 108 MMHG | HEIGHT: 70 IN | HEART RATE: 86 BPM | OXYGEN SATURATION: 94 %

## 2023-09-11 DIAGNOSIS — E11.9 TYPE 2 DIABETES MELLITUS WITHOUT COMPLICATION, WITHOUT LONG-TERM CURRENT USE OF INSULIN (H): Primary | ICD-10-CM

## 2023-09-11 DIAGNOSIS — M79.89 SOFT TISSUE MASS: ICD-10-CM

## 2023-09-11 DIAGNOSIS — D48.5 NEOPLASM OF UNCERTAIN BEHAVIOR OF SKIN: ICD-10-CM

## 2023-09-11 DIAGNOSIS — Z12.11 SCREEN FOR COLON CANCER: ICD-10-CM

## 2023-09-11 DIAGNOSIS — I10 ESSENTIAL HYPERTENSION: ICD-10-CM

## 2023-09-11 PROCEDURE — 99214 OFFICE O/P EST MOD 30 MIN: CPT | Performed by: NURSE PRACTITIONER

## 2023-09-11 RX ORDER — HYDROCHLOROTHIAZIDE 25 MG/1
12.5 TABLET ORAL DAILY
Qty: 90 TABLET | Refills: 0 | Status: SHIPPED | OUTPATIENT
Start: 2023-09-11 | End: 2023-11-24

## 2023-09-11 RX ORDER — LOSARTAN POTASSIUM 50 MG/1
50 TABLET ORAL DAILY
Qty: 30 TABLET | Refills: 1 | Status: SHIPPED | OUTPATIENT
Start: 2023-09-11 | End: 2023-09-11

## 2023-09-11 NOTE — PATIENT INSTRUCTIONS
Increase losartan to 50mg. New prescription sent.     Start trulicity for diabetes. This may also help with some weight loss.

## 2023-09-11 NOTE — PROGRESS NOTES
"  Assessment & Plan     Type 2 diabetes mellitus without complication, without long-term current use of insulin (H)  2 weeks early for hemoglobin a1c. Will start trulicity to help with diabetes as well as weight management.   - dulaglutide (TRULICITY) 0.75 MG/0.5ML pen; Inject 0.75 mg Subcutaneous every 7 days    Essential hypertension  Blood pressure uncontrolled. Increased dose of losartan, will add in hydrochlorothiazide as well. Follow up in 3 weeks. Assisted in scheduling appointment. Advised additional symptoms he may experience with the reduction of his blood pressure medications.   - losartan (COZAAR) 50 MG tablet; Take 1 tablet (50 mg) by mouth daily  - hydrochlorothiazide (HYDRODIURIL) 25 MG tablet; Take 0.5 tablets (12.5 mg) by mouth daily    Soft tissue mass  On anterior chest. Uncertain cause. Ultrasound ordered. Referral to general surgery for consideration of removal. Would recommend biopsy.   - Adult General Surg Referral; Future  - US Soft Tissue Chest Wall or Upper Back; Future    Neoplasm of uncertain behavior of skin  See above.   - Adult General Surg Referral; Future  - US Soft Tissue Chest Wall or Upper Back; Future    Screen for colon cancer  Orders placed.   - Colonoscopy Screening  Referral; Future         BMI:   Estimated body mass index is 44.71 kg/m  as calculated from the following:    Height as of this encounter: 1.778 m (5' 10\").    Weight as of this encounter: 141.3 kg (311 lb 9.6 oz).   Weight management plan: Discussed healthy diet and exercise guidelines      JUDE Clarke CNP  [facility]    Rubens Green is a 53 year old, presenting for the following health issues:  Hypertension        9/11/2023     9:04 AM   Additional Questions   Roomed by Letha PEREA MA   Accompanied by wife       History of Present Illness       Hyperlipidemia:  He presents for follow up of hyperlipidemia.   He is taking medication to lower cholesterol. He is " "not having myalgia or other side effects to statin medications.    Hypertension: He presents for follow up of hypertension.  He does not check blood pressure  regularly outside of the clinic. Outside blood pressures have been over 140/90. He follows a low salt diet.     He eats 4 or more servings of fruits and vegetables daily.He consumes 1 sweetened beverage(s) daily.   He is taking medications regularly.     Blood pressure continues to be uncontrolled. Patient accepts increasing dose of medications as needed.     Dietary changes have been made with reduction of sugar he has been able to reduce his weight from 328 to 311 in 2 months.     Has a chest mass that has been present for a long time. He reports it has gotten bigger as he has gained weight. It is tender to touch. He would like this removed.     He would like orders for a colonoscopy.       Review of Systems   Constitutional, HEENT, cardiovascular, pulmonary, gi and gu systems are negative, except as otherwise noted.      Objective    BP (!) 158/106 (BP Location: Right arm, Patient Position: Chair, Cuff Size: Adult Large)   Pulse 86   Temp 97.6  F (36.4  C) (Tympanic)   Resp 16   Ht 1.778 m (5' 10\")   Wt 141.3 kg (311 lb 9.6 oz)   SpO2 94%   BMI 44.71 kg/m    Body mass index is 44.71 kg/m .  Physical Exam   GENERAL: healthy, alert and no distress  NECK: no adenopathy, no asymmetry, masses, or scars and thyroid normal to palpation  RESP: lungs clear to auscultation - no rales, rhonchi or wheezes  CV: regular rate and rhythm, normal S1 S2, no S3 or S4, no murmur, click or rub, no peripheral edema and peripheral pulses strong  ABDOMEN: soft, nontender, no hepatosplenomegaly, no masses and bowel sounds normal  MS: no gross musculoskeletal defects noted, no edema  SKIN: see photo below. Lesion is 7cm x 5cm  soft. Above the chest wall.                       "

## 2023-09-12 RX ORDER — LOSARTAN POTASSIUM 50 MG/1
50 TABLET ORAL DAILY
Qty: 90 TABLET | Refills: 0 | Status: SHIPPED | OUTPATIENT
Start: 2023-09-12 | End: 2023-12-28

## 2023-09-18 ENCOUNTER — HOSPITAL ENCOUNTER (OUTPATIENT)
Dept: ULTRASOUND IMAGING | Facility: CLINIC | Age: 53
Discharge: HOME | End: 2023-09-18
Attending: NURSE PRACTITIONER | Admitting: NURSE PRACTITIONER
Payer: COMMERCIAL

## 2023-09-18 DIAGNOSIS — D48.5 NEOPLASM OF UNCERTAIN BEHAVIOR OF SKIN: ICD-10-CM

## 2023-09-18 DIAGNOSIS — M79.89 SOFT TISSUE MASS: ICD-10-CM

## 2023-09-18 PROCEDURE — 76604 US EXAM CHEST: CPT | Mod: 52

## 2023-10-04 ENCOUNTER — OFFICE VISIT (OUTPATIENT)
Dept: SURGERY | Facility: CLINIC | Age: 53
End: 2023-10-04
Attending: NURSE PRACTITIONER
Payer: COMMERCIAL

## 2023-10-04 VITALS
TEMPERATURE: 97.8 F | SYSTOLIC BLOOD PRESSURE: 147 MMHG | BODY MASS INDEX: 43.41 KG/M2 | DIASTOLIC BLOOD PRESSURE: 96 MMHG | WEIGHT: 303.2 LBS | HEIGHT: 70 IN

## 2023-10-04 DIAGNOSIS — E66.01 CLASS 3 SEVERE OBESITY DUE TO EXCESS CALORIES WITHOUT SERIOUS COMORBIDITY WITH BODY MASS INDEX (BMI) OF 40.0 TO 44.9 IN ADULT (H): ICD-10-CM

## 2023-10-04 DIAGNOSIS — M79.89 SOFT TISSUE MASS: ICD-10-CM

## 2023-10-04 DIAGNOSIS — E66.813 CLASS 3 SEVERE OBESITY DUE TO EXCESS CALORIES WITHOUT SERIOUS COMORBIDITY WITH BODY MASS INDEX (BMI) OF 40.0 TO 44.9 IN ADULT (H): ICD-10-CM

## 2023-10-04 DIAGNOSIS — D48.5 NEOPLASM OF UNCERTAIN BEHAVIOR OF SKIN: ICD-10-CM

## 2023-10-04 PROCEDURE — 99203 OFFICE O/P NEW LOW 30 MIN: CPT | Performed by: SURGERY

## 2023-10-04 ASSESSMENT — PAIN SCALES - GENERAL: PAINLEVEL: MILD PAIN (2)

## 2023-10-04 NOTE — NURSING NOTE
"Initial BP (!) 147/96 (BP Location: Right arm, Patient Position: Sitting, Cuff Size: Adult Large)   Temp 97.8  F (36.6  C) (Tympanic)   Ht 1.778 m (5' 10\")   Wt 137.5 kg (303 lb 3.2 oz)   BMI 43.50 kg/m   Estimated body mass index is 43.5 kg/m  as calculated from the following:    Height as of this encounter: 1.778 m (5' 10\").    Weight as of this encounter: 137.5 kg (303 lb 3.2 oz). .  Jaja Kemp MA    "

## 2023-10-04 NOTE — LETTER
10/4/2023         RE: Peter Arora  [address]  [place] [address]        Dear Colleague,    Thank you for referring your patient, Peter Arora, to the [facility]. Please see a copy of my visit note below.      Assessment & Plan  Problem List Items Addressed This Visit          Digestive    Obesity     Other Visit Diagnoses       Soft tissue mass        Neoplasm of uncertain behavior of skin               54 yo with left anterior protruding soft tissue mass  -Ultrasound of this mass reported to be 3.8 x 3.1 x 1.3 cm reading as a solid mass  -I recommend we do an excisional biopsy of this area.   -Patient stated he can tolerate this in the office under local.  Thus we will schedule a procedure only appointment on the next visit.  -Risks, benefits, alternatives were explained to the patient; He or she showed understanding and wished to proceed with the above.  Risks includes: Bleeding, infection, seroma, hematoma, possible second surgery depending on final pathology.     Face to Face/patient Contact total time: 15 minutes  Pre Charting time: 5 minutes; Post charting time, communication and other activities: 10 minutes;   Total time:  30 minutes      No follow-ups on file.    [facility] Crain MD  [facility]    Rubens Green is a 53 year old, presenting for the following health issues:  Consult (Mass anterior chest)    Left anterior chest prudenting mass  Been there for at least 15+ years  Not painful unless it gets pulled or push  No leaking or drainage  No similar mass elsewhere  No numbness; no tingling  Been increasing in size the past 1-2 years  Seems like it's been correlating with his weight  No hx of polyps; no NF1 or NF2  No blood thinners               Review of Systems   Constitutional, HEENT, cardiovascular, pulmonary, gi and gu systems are negative, except as otherwise noted.      Objective   There were no vitals taken for this  visit.  There is no height or weight on file to calculate BMI.  Physical Exam  Vitals reviewed.   Eyes:      Conjunctiva/sclera: Conjunctivae normal.   Cardiovascular:      Pulses: Normal pulses.   Musculoskeletal:         General: Normal range of motion.   Skin:                                      Again, thank you for allowing me to participate in the care of your patient.        Sincerely,        JoseMichelle Crain MD

## 2023-10-04 NOTE — PROGRESS NOTES
Assessment & Plan   Problem List Items Addressed This Visit          Digestive    Obesity     Other Visit Diagnoses       Soft tissue mass        Neoplasm of uncertain behavior of skin               54 yo with left anterior protruding soft tissue mass  -Ultrasound of this mass reported to be 3.8 x 3.1 x 1.3 cm reading as a solid mass  -I recommend we do an excisional biopsy of this area.   -Patient stated he can tolerate this in the office under local.  Thus we will schedule a procedure only appointment on the next visit.  -Risks, benefits, alternatives were explained to the patient; He or she showed understanding and wished to proceed with the above.  Risks includes: Bleeding, infection, seroma, hematoma, possible second surgery depending on final pathology.     Face to Face/patient Contact total time: 15 minutes  Pre Charting time: 5 minutes; Post charting time, communication and other activities: 10 minutes;   Total time:  30 minutes      No follow-ups on file.    JoseMichelle Crain MD  [facility] is a 53 year old, presenting for the following health issues:  Consult (Mass anterior chest)    Left anterior chest prudenting mass  Been there for at least 15+ years  Not painful unless it gets pulled or push  No leaking or drainage  No similar mass elsewhere  No numbness; no tingling  Been increasing in size the past 1-2 years  Seems like it's been correlating with his weight  No hx of polyps; no NF1 or NF2  No blood thinners               Review of Systems   Constitutional, HEENT, cardiovascular, pulmonary, gi and gu systems are negative, except as otherwise noted.      Objective    There were no vitals taken for this visit.  There is no height or weight on file to calculate BMI.  Physical Exam  Vitals reviewed.   Eyes:      Conjunctiva/sclera: Conjunctivae normal.   Cardiovascular:      Pulses: Normal pulses.   Musculoskeletal:         General: Normal range of motion.    Skin:

## 2023-10-06 ENCOUNTER — OFFICE VISIT (OUTPATIENT)
Dept: SURGERY | Facility: CLINIC | Age: 53
End: 2023-10-06
Payer: COMMERCIAL

## 2023-10-06 VITALS
OXYGEN SATURATION: 97 % | BODY MASS INDEX: 43.38 KG/M2 | SYSTOLIC BLOOD PRESSURE: 152 MMHG | WEIGHT: 303 LBS | HEART RATE: 99 BPM | HEIGHT: 70 IN | DIASTOLIC BLOOD PRESSURE: 105 MMHG | TEMPERATURE: 97.6 F

## 2023-10-06 DIAGNOSIS — M79.89 SOFT TISSUE MASS: Primary | ICD-10-CM

## 2023-10-06 PROCEDURE — 12032 INTMD RPR S/A/T/EXT 2.6-7.5: CPT | Performed by: SURGERY

## 2023-10-06 PROCEDURE — 11404 EXC TR-EXT B9+MARG 3.1-4 CM: CPT | Mod: 51 | Performed by: SURGERY

## 2023-10-06 PROCEDURE — 88305 TISSUE EXAM BY PATHOLOGIST: CPT | Performed by: PATHOLOGY

## 2023-10-06 PROCEDURE — 88342 IMHCHEM/IMCYTCHM 1ST ANTB: CPT | Performed by: PATHOLOGY

## 2023-10-06 ASSESSMENT — PAIN SCALES - GENERAL: PAINLEVEL: NO PAIN (0)

## 2023-10-06 NOTE — LETTER
10/6/2023         RE: Peter Arora  [address]  [place] [address]        Dear Colleague,    Thank you for referring your patient, Peter Arora, to the [facility]. Please see a copy of my visit note below.    The History and Physical has been reviewed, the patient has been examined and no changes have occurred in the patient's condition since the H & P was completed.     Mylene Crain, DO      Again, thank you for allowing me to participate in the care of your patient.        Sincerely,        Mylene Crain MD

## 2023-10-07 NOTE — PROGRESS NOTES
The History and Physical has been reviewed, the patient has been examined and no changes have occurred in the patient's condition since the H & P was completed.     [facility], DO

## 2023-10-11 LAB
PATH REPORT.COMMENTS IMP SPEC: NORMAL
PATH REPORT.FINAL DX SPEC: NORMAL
PATH REPORT.GROSS SPEC: NORMAL
PATH REPORT.MICROSCOPIC SPEC OTHER STN: NORMAL
PATH REPORT.RELEVANT HX SPEC: NORMAL
PHOTO IMAGE: NORMAL

## 2023-10-12 ENCOUNTER — TELEPHONE (OUTPATIENT)
Dept: PODIATRY | Facility: CLINIC | Age: 53
End: 2023-10-12
Payer: COMMERCIAL

## 2023-10-15 ENCOUNTER — HEALTH MAINTENANCE LETTER (OUTPATIENT)
Age: 53
End: 2023-10-15

## 2023-11-17 ENCOUNTER — DOCUMENTATION ONLY (OUTPATIENT)
Dept: FAMILY MEDICINE | Facility: CLINIC | Age: 53
End: 2023-11-17
Payer: COMMERCIAL

## 2023-11-17 DIAGNOSIS — I10 ESSENTIAL HYPERTENSION: Primary | ICD-10-CM

## 2023-11-17 DIAGNOSIS — E11.9 TYPE 2 DIABETES MELLITUS WITHOUT COMPLICATION, WITHOUT LONG-TERM CURRENT USE OF INSULIN (H): ICD-10-CM

## 2023-11-17 NOTE — PROGRESS NOTES
Peter Arora has an upcoming lab appointment:    Future Appointments   Date Time Provider Department Center   11/20/2023  2:30 PM [facility]   12/8/2023  2:20 PM Josie Martinez APRN CNP [facility]     Patient is scheduled for the following lab(s): Pre-visit labs    There is no order available. Please review and place either future orders or HMPO (Review of Health Maintenance Protocol Orders), as appropriate.    Health Maintenance Due   Topic    HIV SCREENING     HEPATITIS C SCREENING     A1C      Loren Garcia

## 2023-11-20 ENCOUNTER — LAB (OUTPATIENT)
Dept: LAB | Facility: CLINIC | Age: 53
End: 2023-11-20
Payer: COMMERCIAL

## 2023-11-20 DIAGNOSIS — Z11.59 NEED FOR HEPATITIS C SCREENING TEST: ICD-10-CM

## 2023-11-20 DIAGNOSIS — E11.9 TYPE 2 DIABETES MELLITUS WITHOUT COMPLICATION, WITHOUT LONG-TERM CURRENT USE OF INSULIN (H): ICD-10-CM

## 2023-11-20 DIAGNOSIS — Z11.4 SCREENING FOR HIV (HUMAN IMMUNODEFICIENCY VIRUS): Primary | ICD-10-CM

## 2023-11-20 DIAGNOSIS — I10 ESSENTIAL HYPERTENSION: ICD-10-CM

## 2023-11-20 LAB
ANION GAP SERPL CALCULATED.3IONS-SCNC: 8 MMOL/L (ref 7–15)
BUN SERPL-MCNC: 13.9 MG/DL (ref 6–20)
CALCIUM SERPL-MCNC: 8.8 MG/DL (ref 8.6–10)
CHLORIDE SERPL-SCNC: 104 MMOL/L (ref 98–107)
CHOLEST SERPL-MCNC: 111 MG/DL
CREAT SERPL-MCNC: 0.81 MG/DL (ref 0.67–1.17)
DEPRECATED HCO3 PLAS-SCNC: 27 MMOL/L (ref 22–29)
EGFRCR SERPLBLD CKD-EPI 2021: >90 ML/MIN/1.73M2
GLUCOSE SERPL-MCNC: 104 MG/DL (ref 70–99)
HBA1C MFR BLD: 6.1 % (ref 0–5.6)
HDLC SERPL-MCNC: 42 MG/DL
LDLC SERPL CALC-MCNC: 56 MG/DL
NONHDLC SERPL-MCNC: 69 MG/DL
POTASSIUM SERPL-SCNC: 4.5 MMOL/L (ref 3.4–5.3)
SODIUM SERPL-SCNC: 139 MMOL/L (ref 135–145)
TRIGL SERPL-MCNC: 64 MG/DL

## 2023-11-20 PROCEDURE — 86803 HEPATITIS C AB TEST: CPT

## 2023-11-20 PROCEDURE — 83036 HEMOGLOBIN GLYCOSYLATED A1C: CPT

## 2023-11-20 PROCEDURE — 80048 BASIC METABOLIC PNL TOTAL CA: CPT

## 2023-11-20 PROCEDURE — 87389 HIV-1 AG W/HIV-1&-2 AB AG IA: CPT

## 2023-11-20 PROCEDURE — 80061 LIPID PANEL: CPT

## 2023-11-20 PROCEDURE — 36415 COLL VENOUS BLD VENIPUNCTURE: CPT

## 2023-11-21 LAB
HCV AB SERPL QL IA: NONREACTIVE
HIV 1+2 AB+HIV1 P24 AG SERPL QL IA: NONREACTIVE

## 2023-11-24 ENCOUNTER — MYC REFILL (OUTPATIENT)
Dept: FAMILY MEDICINE | Facility: CLINIC | Age: 53
End: 2023-11-24
Payer: COMMERCIAL

## 2023-11-24 DIAGNOSIS — E11.9 TYPE 2 DIABETES MELLITUS WITHOUT COMPLICATION, WITHOUT LONG-TERM CURRENT USE OF INSULIN (H): ICD-10-CM

## 2023-11-24 DIAGNOSIS — E11.29 TYPE 2 DIABETES MELLITUS WITH MICROALBUMINURIA, WITHOUT LONG-TERM CURRENT USE OF INSULIN (H): ICD-10-CM

## 2023-11-24 DIAGNOSIS — I10 ESSENTIAL HYPERTENSION: ICD-10-CM

## 2023-11-24 DIAGNOSIS — R80.9 TYPE 2 DIABETES MELLITUS WITH MICROALBUMINURIA, WITHOUT LONG-TERM CURRENT USE OF INSULIN (H): ICD-10-CM

## 2023-11-24 RX ORDER — HYDROCHLOROTHIAZIDE 25 MG/1
12.5 TABLET ORAL DAILY
Qty: 90 TABLET | Refills: 0 | Status: SHIPPED | OUTPATIENT
Start: 2023-11-24 | End: 2023-12-28

## 2023-11-24 RX ORDER — METFORMIN HCL 500 MG
1000 TABLET, EXTENDED RELEASE 24 HR ORAL 2 TIMES DAILY
Qty: 360 TABLET | Refills: 3 | OUTPATIENT
Start: 2023-11-24

## 2023-11-24 NOTE — TELEPHONE ENCOUNTER
Prescription approved per [facility] Refill Protocol     Vonda Oliva     RN MSN

## 2023-12-08 ENCOUNTER — OFFICE VISIT (OUTPATIENT)
Dept: FAMILY MEDICINE | Facility: CLINIC | Age: 53
End: 2023-12-08
Payer: COMMERCIAL

## 2023-12-08 VITALS
BODY MASS INDEX: 42.9 KG/M2 | HEART RATE: 102 BPM | RESPIRATION RATE: 18 BRPM | OXYGEN SATURATION: 97 % | DIASTOLIC BLOOD PRESSURE: 84 MMHG | SYSTOLIC BLOOD PRESSURE: 136 MMHG | WEIGHT: 299 LBS | TEMPERATURE: 98.2 F

## 2023-12-08 DIAGNOSIS — E66.813 CLASS 3 SEVERE OBESITY DUE TO EXCESS CALORIES WITHOUT SERIOUS COMORBIDITY WITH BODY MASS INDEX (BMI) OF 40.0 TO 44.9 IN ADULT (H): ICD-10-CM

## 2023-12-08 DIAGNOSIS — E11.9 TYPE 2 DIABETES MELLITUS WITHOUT COMPLICATION, WITHOUT LONG-TERM CURRENT USE OF INSULIN (H): Primary | ICD-10-CM

## 2023-12-08 DIAGNOSIS — I10 ESSENTIAL HYPERTENSION, BENIGN: ICD-10-CM

## 2023-12-08 DIAGNOSIS — E66.01 CLASS 3 SEVERE OBESITY DUE TO EXCESS CALORIES WITHOUT SERIOUS COMORBIDITY WITH BODY MASS INDEX (BMI) OF 40.0 TO 44.9 IN ADULT (H): ICD-10-CM

## 2023-12-08 PROCEDURE — 99214 OFFICE O/P EST MOD 30 MIN: CPT | Performed by: NURSE PRACTITIONER

## 2023-12-08 ASSESSMENT — PAIN SCALES - GENERAL: PAINLEVEL: NO PAIN (0)

## 2023-12-08 NOTE — PROGRESS NOTES
Assessment & Plan       ICD-10-CM    1. Type 2 diabetes mellitus without complication, without long-term current use of insulin (H)  E11.9       2. Benign Essential Hypertension  I10       3. Class 3 severe obesity due to excess calories without serious comorbidity with body mass index (BMI) of 40.0 to 44.9 in adult (H)  E66.01 dulaglutide (TRULICITY) 1.5 MG/0.5ML pen    Z68.41         Significant improvement in diabetes management he has also had success with weight loss with the initiation of Trulicity.  Recommend staying on the Trulicity I did increase the dose to continue to assist with weight management.  Blood pressure is also notably improved with the weight loss as well lifts the initiation of the blood pressure medications.  He denies any significant side effects.  Plan follow-up in 3 months or sooner as needed.       JUDE Clarke [facility]    Rubens Green is a 53 year old, presenting for the following health issues:  Recheck Medication, Hypertension, Diabetes, and Lipids        12/8/2023     1:55 PM   Additional Questions   Roomed by Addie ESCALERA MA   Accompanied by Self       History of Present Illness       Hypertension: He presents for follow up of hypertension.  He does not check blood pressure  regularly outside of the clinic. Outside blood pressures have been over 140/90. He follows a low salt diet.     He eats 2-3 servings of fruits and vegetables daily.He consumes 0 sweetened beverage(s) daily.   He is taking medications regularly.       Diabetes Follow-up  How often are you checking your blood sugar? Not at all  What concerns do you have today about your diabetes? None   Do you have any of these symptoms? (Select all that apply)  No numbness or tingling in feet.  No redness, sores or blisters on feet.  No complaints of excessive thirst.  No reports of blurry vision.  No significant changes to weight.    With being on the medication he noticed improve  vision, less fatigue, weight loss. He has also noted improvement in the blood pressure.     BP Readings from Last 2 Encounters:   12/08/23 136/84   10/06/23 (!) 152/105     Hemoglobin A1C (%)   Date Value   11/20/2023 6.1 (H)   06/22/2023 7.3 (H)     LDL Cholesterol Calculated   Date Value   11/20/2023 56 mg/dL   06/22/2023      Comment:     Cannot estimate LDL when triglyceride exceeds 400 mg/dL     LDL Cholesterol Direct (mg/dL)   Date Value   06/22/2023 144 (H)             Hyperlipidemia Follow-Up  Are you regularly taking any medication or supplement to lower your cholesterol?   Yes- Atorvastatin  Are you having muscle aches or other side effects that you think could be caused by your cholesterol lowering medication?  No    Review of Systems   Constitutional, HEENT, cardiovascular, pulmonary, gi and gu systems are negative, except as otherwise noted.      Objective    /84   Pulse 102   Temp 98.2  F (36.8  C) (Tympanic)   Resp 18   Wt 135.6 kg (299 lb)   SpO2 97%   BMI 42.90 kg/m    Body mass index is 42.9 kg/m .    Physical Exam   GENERAL: healthy, alert and no distress  NECK: no adenopathy, no asymmetry, masses, or scars and thyroid normal to palpation  RESP: lungs clear to auscultation - no rales, rhonchi or wheezes  CV: regular rate and rhythm, normal S1 S2, no S3 or S4, no murmur, click or rub, no peripheral edema and peripheral pulses strong  ABDOMEN: soft, nontender, no hepatosplenomegaly, no masses and bowel sounds normal  MS: no gross musculoskeletal defects noted, no edema  PSYCH: mentation appears normal, affect normal/bright

## 2023-12-08 NOTE — PATIENT INSTRUCTIONS
Find out from insurance what GLP -1 medications may be approved with your insurance plan. Currently you have a prescription for trulicity. You may have a medication deductable that needs to be met then you will get better coverage ect.     Other medications in this class is liraglutide, semaglutide, tirzapeptide

## 2023-12-19 ENCOUNTER — PATIENT OUTREACH (OUTPATIENT)
Dept: FAMILY MEDICINE | Facility: CLINIC | Age: 53
End: 2023-12-19
Payer: COMMERCIAL

## 2023-12-19 NOTE — TELEPHONE ENCOUNTER
Patient Quality Outreach    Patient is due for the following:   Colon Cancer Screening    Next Steps:   Schedule a office visit for colorectal screening    Type of outreach:    Sent [employer] message.    Next Steps:  Reach out within 90 days via [employer].    Max number of attempts reached: Yes. Will try again in 90 days if patient still on fail list.    Questions for provider review:    None           Heather Mcmanus MA

## 2023-12-28 ENCOUNTER — MYC REFILL (OUTPATIENT)
Dept: FAMILY MEDICINE | Facility: CLINIC | Age: 53
End: 2023-12-28
Payer: COMMERCIAL

## 2023-12-28 DIAGNOSIS — I10 ESSENTIAL HYPERTENSION: ICD-10-CM

## 2023-12-28 DIAGNOSIS — E66.813 CLASS 3 SEVERE OBESITY DUE TO EXCESS CALORIES WITHOUT SERIOUS COMORBIDITY WITH BODY MASS INDEX (BMI) OF 40.0 TO 44.9 IN ADULT (H): ICD-10-CM

## 2023-12-28 DIAGNOSIS — E66.01 CLASS 3 SEVERE OBESITY DUE TO EXCESS CALORIES WITHOUT SERIOUS COMORBIDITY WITH BODY MASS INDEX (BMI) OF 40.0 TO 44.9 IN ADULT (H): ICD-10-CM

## 2023-12-28 RX ORDER — HYDROCHLOROTHIAZIDE 25 MG/1
12.5 TABLET ORAL DAILY
Qty: 45 TABLET | Refills: 2 | Status: SHIPPED | OUTPATIENT
Start: 2023-12-28 | End: 2024-01-09

## 2023-12-28 RX ORDER — LOSARTAN POTASSIUM 50 MG/1
50 TABLET ORAL DAILY
Qty: 90 TABLET | Refills: 2 | Status: SHIPPED | OUTPATIENT
Start: 2023-12-28

## 2023-12-28 NOTE — TELEPHONE ENCOUNTER
Prescription approved per [facility] Refill Protocol     Vonda Oliva     RN MSN

## 2024-01-09 ENCOUNTER — MYC REFILL (OUTPATIENT)
Dept: FAMILY MEDICINE | Facility: CLINIC | Age: 54
End: 2024-01-09
Payer: COMMERCIAL

## 2024-01-09 DIAGNOSIS — I10 ESSENTIAL HYPERTENSION: ICD-10-CM

## 2024-01-09 RX ORDER — HYDROCHLOROTHIAZIDE 25 MG/1
12.5 TABLET ORAL DAILY
Qty: 45 TABLET | Refills: 2 | Status: SHIPPED | OUTPATIENT
Start: 2024-01-09 | End: 2024-01-10

## 2024-01-10 ENCOUNTER — MYC MEDICAL ADVICE (OUTPATIENT)
Dept: FAMILY MEDICINE | Facility: CLINIC | Age: 54
End: 2024-01-10
Payer: COMMERCIAL

## 2024-01-10 DIAGNOSIS — E66.01 CLASS 3 SEVERE OBESITY DUE TO EXCESS CALORIES WITHOUT SERIOUS COMORBIDITY WITH BODY MASS INDEX (BMI) OF 40.0 TO 44.9 IN ADULT (H): ICD-10-CM

## 2024-01-10 DIAGNOSIS — I10 ESSENTIAL HYPERTENSION: ICD-10-CM

## 2024-01-10 DIAGNOSIS — E66.813 CLASS 3 SEVERE OBESITY DUE TO EXCESS CALORIES WITHOUT SERIOUS COMORBIDITY WITH BODY MASS INDEX (BMI) OF 40.0 TO 44.9 IN ADULT (H): ICD-10-CM

## 2024-01-10 RX ORDER — HYDROCHLOROTHIAZIDE 12.5 MG/1
12.5 TABLET ORAL DAILY
Qty: 90 TABLET | Refills: 3 | Status: SHIPPED | OUTPATIENT
Start: 2024-01-10 | End: 2024-04-04

## 2024-02-02 ENCOUNTER — TRANSFERRED RECORDS (OUTPATIENT)
Dept: HEALTH INFORMATION MANAGEMENT | Facility: CLINIC | Age: 54
End: 2024-02-02
Payer: COMMERCIAL

## 2024-02-23 ENCOUNTER — MYC MEDICAL ADVICE (OUTPATIENT)
Dept: FAMILY MEDICINE | Facility: CLINIC | Age: 54
End: 2024-02-23
Payer: COMMERCIAL

## 2024-02-27 DIAGNOSIS — E66.01 CLASS 3 SEVERE OBESITY DUE TO EXCESS CALORIES WITHOUT SERIOUS COMORBIDITY WITH BODY MASS INDEX (BMI) OF 40.0 TO 44.9 IN ADULT (H): Primary | ICD-10-CM

## 2024-02-27 DIAGNOSIS — E11.9 TYPE 2 DIABETES MELLITUS WITHOUT COMPLICATION, WITHOUT LONG-TERM CURRENT USE OF INSULIN (H): ICD-10-CM

## 2024-02-27 DIAGNOSIS — E66.813 CLASS 3 SEVERE OBESITY DUE TO EXCESS CALORIES WITHOUT SERIOUS COMORBIDITY WITH BODY MASS INDEX (BMI) OF 40.0 TO 44.9 IN ADULT (H): Primary | ICD-10-CM

## 2024-02-27 NOTE — TELEPHONE ENCOUNTER
Peter SAMUEL [facility] (supporting Josie Martinez, APRN CNP)1 minute ago (2:24 PM)     CK  Good afternoon,  [place] for the delay[place]  We went to [facility] and they said they have the Ozempic on hand, if you would send in a prescription for Ozempic we would appreciate it. I'm not sure what dose mg, but I figure you know.   Also, they said if you could start the PA that would be helpful.  If you have any questions, please email us back.    Have a great day.  Thank you, we appreciate it.  JUDE Hernandez CNP4 days ago     LF  There are other options for medication that are in the same class as Trulicity.  Liraglutide is a daily injectable and Ozempic is a once weekly along with Mounjaro which is also once weekly.  If you would like I can send 1 of these in and stevens and see if it is covered by your insurance or another you can do is check with insurance to see what they have preference for let me know.  I can send something in on Monday when I am back in clinic.     JUDE Clarke CNP [facility] (supporting JUED Vinson CNP)4 days ago     CK  Kofi, we are just wondering if there is a substitute for the Trulicity shot because we have not been able to get it yet.  We have it on order at [facility] but they keep telling us it's not in yet.  Peter had his colonoscopy yesterday and they were concerned that he has sleep apnea and a very bad cough enough so that they could not get all of one of the polyps.  He was doing so much better with everything (not coughing as much) when he was on the shot, so we just thought we would ask about a substitute for the shot.  Have a great day  Thank you, Peter Arora

## 2024-02-27 NOTE — TELEPHONE ENCOUNTER
Peter SAMUEL [facility] (supporting Josie Martinez, APRN CNP)1 minute ago (2:24 PM)     CK  Good afternoon,  [place] for the delay[place]  We went to [facility] and they said they have the Ozempic on hand, if you would send in a prescription for Ozempic we would appreciate it. I'm not sure what dose mg, but I figure you know.   Also, they said if you could start the PA that would be helpful.  If you have any questions, please email us back.    Have a great day.  Thank you, we appreciate it.  JUDE Hernandez CNP4 days ago     LF  There are other options for medication that are in the same class as Trulicity.  Liraglutide is a daily injectable and Ozempic is a once weekly along with Mounjaro which is also once weekly.  If you would like I can send 1 of these in and stevens and see if it is covered by your insurance or another you can do is check with insurance to see what they have preference for let me know.  I can send something in on Monday when I am back in clinic.     JUDE Clarke CNP [facility] (supporting JUDE Vinson CNP)4 days ago     CK  Kofi, we are just wondering if there is a substitute for the Trulicity shot because we have not been able to get it yet.  We have it on order at [facility] but they keep telling us it's not in yet.  Peter had his colonoscopy yesterday and they were concerned that he has sleep apnea and a very bad cough enough so that they could not get all of one of the polyps.  He was doing so much better with everything (not coughing as much) when he was on the shot, so we just thought we would ask about a substitute for the shot.  Have a great day  Thank you, Peter Arora

## 2024-03-03 ENCOUNTER — HEALTH MAINTENANCE LETTER (OUTPATIENT)
Age: 54
End: 2024-03-03

## 2024-03-19 ENCOUNTER — MYC MEDICAL ADVICE (OUTPATIENT)
Dept: FAMILY MEDICINE | Facility: CLINIC | Age: 54
End: 2024-03-19
Payer: COMMERCIAL

## 2024-03-19 DIAGNOSIS — E66.813 CLASS 3 SEVERE OBESITY DUE TO EXCESS CALORIES WITHOUT SERIOUS COMORBIDITY WITH BODY MASS INDEX (BMI) OF 40.0 TO 44.9 IN ADULT (H): ICD-10-CM

## 2024-03-19 DIAGNOSIS — E66.01 CLASS 3 SEVERE OBESITY DUE TO EXCESS CALORIES WITHOUT SERIOUS COMORBIDITY WITH BODY MASS INDEX (BMI) OF 40.0 TO 44.9 IN ADULT (H): ICD-10-CM

## 2024-03-19 DIAGNOSIS — E11.9 TYPE 2 DIABETES MELLITUS WITHOUT COMPLICATION, WITHOUT LONG-TERM CURRENT USE OF INSULIN (H): ICD-10-CM

## 2024-03-20 NOTE — TELEPHONE ENCOUNTER
See my chart message    Patient okay with increase in Ozempic dose    Currently 2 mg dose    Not clear what to pend as 2 mg is highest found for Ozempic brand    Tahmina Negro RN on 3/20/2024 at 11:58 AM

## 2024-04-04 ENCOUNTER — LAB (OUTPATIENT)
Dept: LAB | Facility: CLINIC | Age: 54
End: 2024-04-04
Payer: COMMERCIAL

## 2024-04-04 ENCOUNTER — OFFICE VISIT (OUTPATIENT)
Dept: FAMILY MEDICINE | Facility: CLINIC | Age: 54
End: 2024-04-04
Payer: COMMERCIAL

## 2024-04-04 ENCOUNTER — MYC REFILL (OUTPATIENT)
Dept: FAMILY MEDICINE | Facility: CLINIC | Age: 54
End: 2024-04-04

## 2024-04-04 VITALS
RESPIRATION RATE: 16 BRPM | OXYGEN SATURATION: 97 % | SYSTOLIC BLOOD PRESSURE: 124 MMHG | DIASTOLIC BLOOD PRESSURE: 66 MMHG | HEART RATE: 87 BPM | BODY MASS INDEX: 40 KG/M2 | HEIGHT: 70 IN | WEIGHT: 279.4 LBS | TEMPERATURE: 97.8 F

## 2024-04-04 DIAGNOSIS — E66.01 CLASS 3 SEVERE OBESITY DUE TO EXCESS CALORIES WITHOUT SERIOUS COMORBIDITY WITH BODY MASS INDEX (BMI) OF 40.0 TO 44.9 IN ADULT (H): ICD-10-CM

## 2024-04-04 DIAGNOSIS — R80.9 TYPE 2 DIABETES MELLITUS WITH MICROALBUMINURIA, WITHOUT LONG-TERM CURRENT USE OF INSULIN (H): ICD-10-CM

## 2024-04-04 DIAGNOSIS — Z86.0100 HISTORY OF COLONIC POLYPS: ICD-10-CM

## 2024-04-04 DIAGNOSIS — E66.813 CLASS 3 SEVERE OBESITY DUE TO EXCESS CALORIES WITHOUT SERIOUS COMORBIDITY WITH BODY MASS INDEX (BMI) OF 40.0 TO 44.9 IN ADULT (H): ICD-10-CM

## 2024-04-04 DIAGNOSIS — I10 ESSENTIAL HYPERTENSION, BENIGN: ICD-10-CM

## 2024-04-04 DIAGNOSIS — E11.29 TYPE 2 DIABETES MELLITUS WITH MICROALBUMINURIA, WITHOUT LONG-TERM CURRENT USE OF INSULIN (H): ICD-10-CM

## 2024-04-04 DIAGNOSIS — I10 ESSENTIAL HYPERTENSION: ICD-10-CM

## 2024-04-04 DIAGNOSIS — Z01.818 PREOP GENERAL PHYSICAL EXAM: Primary | ICD-10-CM

## 2024-04-04 LAB — HBA1C MFR BLD: 5.9 % (ref 0–5.6)

## 2024-04-04 PROCEDURE — 36415 COLL VENOUS BLD VENIPUNCTURE: CPT

## 2024-04-04 PROCEDURE — 83036 HEMOGLOBIN GLYCOSYLATED A1C: CPT

## 2024-04-04 PROCEDURE — 99214 OFFICE O/P EST MOD 30 MIN: CPT | Performed by: NURSE PRACTITIONER

## 2024-04-04 RX ORDER — LOSARTAN POTASSIUM 50 MG/1
50 TABLET ORAL DAILY
Qty: 90 TABLET | Refills: 2 | OUTPATIENT
Start: 2024-04-04

## 2024-04-04 ASSESSMENT — PAIN SCALES - GENERAL: PAINLEVEL: NO PAIN (0)

## 2024-04-04 NOTE — TELEPHONE ENCOUNTER
Pharmacy requested refills that are already active on file. Refused request to pharmacy.  
Yes-Patient/Caregiver accepts free interpretation services...

## 2024-04-04 NOTE — PROGRESS NOTES
Preoperative Evaluation  [facility]  [address]  [facility] [address]  Phone: [phone]  Primary Provider: Evi Ref-Primary, Physician  Pre-op Performing Provider: DM STEWART  Apr 4, 2024       Peter is a 53 year old, presenting for the following:  Pre-Op Exam        4/4/2024     7:31 AM   Additional Questions   Roomed by Addie ESCALERA MA     Surgical Information  Surgery/Procedure: Colonoscopy   Surgery Location: [facility]  Surgeon: Kiran Nelson MD  Surgery Date: 05/03/2024  Time of Surgery: TBD  Where patient plans to recover: At home with family  Fax number for surgical facility: [phone]    Assessment & Plan     The proposed surgical procedure is considered LOW risk.    Preop general physical exam  History of colonic polyps  Due for colonoscopy to be repeated due to retained additional polyp. He is low risk for planned procedure. Advised to hold all medications prior to surgery. Ozempic the week before. Asa 5 days.     Class 3 severe obesity due to excess calories without serious comorbidity with body mass index (BMI) of 40.0 to 44.9 in adult (H)  Improvement of his weight with a 60lb weight loss. Efforts at continued weight loss encouraged.     Type 2 diabetes mellitus with microalbuminuria, without long-term current use of insulin (H)  Hemoglobin a1c is 5.9% today. Continue with medications as prescribed.   - HEMOGLOBIN A1C; Future    Benign Essential Hypertension  Stop hydrochlorothiazide. Follow up in 6 months for labs and diabetes recheck. Monitor blood pressure occasionally.        - No identified additional risk factors other than previously addressed    Antiplatelet or Anticoagulation Medication Instructions   - aspirin: Discontinue aspirin 7-10 days prior to procedure to reduce bleeding risk. It should be resumed postoperatively.     Additional Medication Instructions   - ACE/ARB: HOLD on day of surgery (minimum 11 hours for  general anesthesia).   - Diuretics: HOLD on the day of surgery.   - Statins: Continue taking on the day of surgery.    - GLP-1 Injectable (exenitide, liraglutide, semaglutide, dulaglutide, etc.): HOLD 7 days before surgery     Recommendation  APPROVAL GIVEN to proceed with proposed procedure, without further diagnostic evaluation.          Subjective       HPI related to upcoming procedure: colonoscopy attempted 1 month ago, due to snoring/ coughing  and large polyp it was recommended for repeat colonoscopy to resect the remainder of a residual polyp.      History of diabetes. Feeling well, has lost 60lbs with lifestyle changes and diabetes management.         4/3/2024    12:33 PM   Preop Questions   1. Have you ever had a heart attack or stroke? No   2. Have you ever had surgery on your heart or blood vessels, such as a stent placement, a coronary artery bypass, or surgery on an artery in your head, neck, heart, or legs? No   3. Do you have chest pain with activity? No   4. Do you have a history of  heart failure? No   5. Do you currently have a cold, bronchitis or symptoms of other infection? No   6. Do you have a cough, shortness of breath, or wheezing? No   7. Do you or anyone in your family have previous history of blood clots? No   8. Do you or does anyone in your family have a serious bleeding problem such as prolonged bleeding following surgeries or cuts? No   9. Have you ever had problems with anemia or been told to take iron pills? No   10. Have you had any abnormal blood loss such as black, tarry or bloody stools? No   11. Have you ever had a blood transfusion? No   12. Are you willing to have a blood transfusion if it is medically needed before, during, or after your surgery? Yes   13. Have you or any of your relatives ever had problems with anesthesia? No   14. Do you have sleep apnea, excessive snoring or daytime drowsiness? No   15. Do you have any artifical heart valves or other implanted medical  devices like a pacemaker, defibrillator, or continuous glucose monitor? No   16. Do you have artificial joints? No   17. Are you allergic to latex? No       Health Care Directive  Patient does not have a Health Care Directive or Living Will: Discussed advance care planning with patient; however, patient declined at this time.    Preoperative Review of    reviewed - no record of controlled substances prescribed.      Status of Chronic Conditions:  DIABETES - Patient has a longstanding history of DiabetesType Type II . Patient is being treated with oral agents and GLP-1 and denies significant side effects. Control has been good. Complicating factors include but are not limited to: hypertension and hyperlipidemia.     HYPERLIPIDEMIA - Patient has a long history of significant Hyperlipidemia requiring medication for treatment with recent good control. Patient reports no problems or side effects with the medication.     HYPERTENSION - Patient has longstanding history of HTN , currently denies any symptoms referable to elevated blood pressure. Specifically denies chest pain, palpitations, dyspnea, orthopnea, PND or peripheral edema. Blood pressure readings have been in normal range. Current medication regimen is as listed below. Patient denies any side effects of medication.     Patient Active Problem List    Diagnosis Date Noted    Type 2 diabetes mellitus with microalbuminuria, without long-term current use of insulin (H) 03/04/2021     Priority: Medium    Prediabetes 03/27/2019     Priority: Medium    Morbid obesity (H) 03/25/2019     Priority: Medium    Peripheral Neuropathy      Priority: Medium     Created by Conversion  Replacement Utility updated for latest IMO load        Obesity      Priority: Medium     Created by Conversion        Benign Essential Hypertension      Priority: Medium     Created by Conversion        Carpal Tunnel Syndrome      Priority: Medium     Created by Conversion        Allergies      " Priority: Medium     Created by Conversion          Past Medical History:   Diagnosis Date    Carpal tunnel syndrome     Created by Conversion     Essential hypertension, benign     Created by Conversion     Hereditary and idiopathic peripheral neuropathy     Created by Conversion  Replacement Utility updated for latest IMO load    Obesity, unspecified     Created by Conversion     Other allergy, other than to medicinal agents     Created by Conversion      History reviewed. No pertinent surgical history.  Current Outpatient Medications   Medication Sig Dispense Refill    atorvastatin (LIPITOR) 40 MG tablet Take 1 tablet (40 mg) by mouth daily 90 tablet 3    losartan (COZAAR) 50 MG tablet Take 1 tablet (50 mg) by mouth daily 90 tablet 2    metFORMIN (GLUCOPHAGE XR) 500 MG 24 hr tablet Take 2 tablets (1,000 mg) by mouth 2 times daily for 360 days 360 tablet 3    semaglutide (OZEMPIC) 2 MG/3ML pen Inject 0.5 mg Subcutaneous every 7 days 12 mL 1    meloxicam (MOBIC) 7.5 MG tablet Take 1 tablet (7.5 mg) by mouth daily (Patient not taking: Reported on 9/11/2023) 30 tablet 1       Allergies   Allergen Reactions    Lisinopril Unknown     Cough        Social History     Tobacco Use    Smoking status: Never    Smokeless tobacco: Never   Substance Use Topics    Alcohol use: Yes     Comment: social     Family History   Problem Relation Age of Onset    Diabetes Brother      History   Drug Use Unknown         Review of Systems    Review of Systems  Constitutional, HEENT, cardiovascular, pulmonary, GI, , musculoskeletal, neuro, skin, endocrine and psych systems are negative, except as otherwise noted.    Objective    /66   Pulse 87   Temp 97.8  F (36.6  C) (Tympanic)   Resp 16   Ht 1.778 m (5' 10\")   Wt 126.7 kg (279 lb 6.4 oz)   SpO2 97%   BMI 40.09 kg/m     Estimated body mass index is 40.09 kg/m  as calculated from the following:    Height as of this encounter: 1.778 m (5' 10\").    Weight as of this encounter: " 126.7 kg (279 lb 6.4 oz).    Physical Exam  GENERAL: alert and no distress  EYES: Eyes grossly normal to inspection, PERRL and conjunctivae and sclerae normal  HENT: ear canals and TM's normal, nose and mouth without ulcers or lesions  NECK: no adenopathy, no asymmetry, masses, or scars  RESP: lungs clear to auscultation - no rales, rhonchi or wheezes  CV: regular rate and rhythm, normal S1 S2, no S3 or S4, no murmur, click or rub, no peripheral edema  ABDOMEN: soft, nontender, no hepatosplenomegaly, no masses and bowel sounds normal  MS: no gross musculoskeletal defects noted, no edema  SKIN: no suspicious lesions or rashes  NEURO: Normal strength and tone, mentation intact and speech normal  PSYCH: mentation appears normal, affect normal/bright  LYMPH: no cervical, supraclavicular, axillary, or inguinal adenopathy    Recent Labs   Lab Test 11/20/23  1421 06/22/23  1448   HGB  --  16.2   PLT  --  212    142   POTASSIUM 4.5 4.9   CR 0.81 0.94   A1C 6.1* 7.3*        Diagnostics  Labs pending at this time.  Results will be reviewed when available.   No EKG required for low risk surgery (cataract, skin procedure, breast biopsy, etc).    Revised Cardiac Risk Index (RCRI)  The patient has the following serious cardiovascular risks for perioperative complications:   - No serious cardiac risks = 0 points     RCRI Interpretation: 0 points: Class I (very low risk - 0.4% complication rate)         Signed Electronically by: JUDE Clarke CNP  Copy of this evaluation report is provided to requesting physician.

## 2024-04-04 NOTE — PATIENT INSTRUCTIONS
Preparing for Your Surgery  Getting started  A nurse will call you to review your health history and instructions. They will give you an arrival time based on your scheduled surgery time. Please be ready to share:  Your doctor's clinic name and phone number  Your medical, surgical, and anesthesia history  A list of allergies and sensitivities  A list of medicines, including herbal treatments and over-the-counter drugs  Whether the patient has a legal guardian (ask how to send us the papers in advance)  Please tell us if you're pregnant--or if there's any chance you might be pregnant. Some surgeries may injure a fetus (unborn baby), so they require a pregnancy test. Surgeries that are safe for a fetus don't always need a test, and you can choose whether to have one.   If you have a child who's having surgery, please ask for a copy of Preparing for Your Child's Surgery.    Preparing for surgery  Within 10 to 30 days of surgery: Have a pre-op exam (sometimes called an H&P, or History and Physical). This can be done at a clinic or pre-operative center.  If you're having a , you may not need this exam. Talk to your care team.  At your pre-op exam, talk to your care team about all medicines you take. If you need to stop any medicines before surgery, ask when to start taking them again.  We do this for your safety. Many medicines can make you bleed too much during surgery. Some change how well surgery (anesthesia) drugs work.  Call your insurance company to let them know you're having surgery. (If you don't have insurance, call [phone].)  Call your clinic if there's any change in your health. This includes signs of a cold or flu (sore throat, runny nose, cough, rash, fever). It also includes a scrape or scratch near the surgery site.  If you have questions on the day of surgery, call your hospital or surgery center.  Eating and drinking guidelines  For your safety: Unless your surgeon tells you otherwise,  follow the guidelines below.  Eat and drink as usual until 8 hours before you arrive for surgery. After that, no food or milk.  Drink clear liquids until 2 hours before you arrive. These are liquids you can see through, like water, Gatorade, and Propel Water. They also include plain black coffee and tea (no cream or milk), candy, and breath mints. You can spit out gum when you arrive.  If you drink alcohol: Stop drinking it the night before surgery.  If your care team tells you to take medicine on the morning of surgery, it's okay to take it with a sip of water.  Preventing infection  Shower or bathe the night before and morning of your surgery. Follow the instructions your clinic gave you. (If no instructions, use regular soap.)  Don't shave or clip hair near your surgery site. We'll remove the hair if needed.  Don't smoke or vape the morning of surgery. You may chew nicotine gum up to 2 hours before surgery. A nicotine patch is okay.  Note: Some surgeries require you to completely quit smoking and nicotine. Check with your surgeon.  Your care team will make every effort to keep you safe from infection. We will:  Clean our hands often with soap and water (or an alcohol-based hand rub).  Clean the skin at your surgery site with a special soap that kills germs.  Give you a special gown to keep you warm. (Cold raises the risk of infection.)  Wear special hair covers, masks, gowns and gloves during surgery.  Give antibiotic medicine, if prescribed. Not all surgeries need antibiotics.  What to bring on the day of surgery  Photo ID and insurance card  Copy of your health care directive, if you have one  Glasses and hearing aids (bring cases)  You can't wear contacts during surgery  Inhaler and eye drops, if you use them (tell us about these when you arrive)  CPAP machine or breathing device, if you use them  A few personal items, if spending the night  If you have . . .  A pacemaker, ICD (cardiac defibrillator) or other  implant: Bring the ID card.  An implanted stimulator: Bring the remote control.  A legal guardian: Bring a copy of the certified (court-stamped) guardianship papers.  Please remove any jewelry, including body piercings. Leave jewelry and other valuables at home.  If you're going home the day of surgery  You must have a responsible adult drive you home. They should stay with you overnight as well.  If you don't have someone to stay with you, and you aren't safe to go home alone, we may keep you overnight. Insurance often won't pay for this.  After surgery  If it's hard to control your pain or you need more pain medicine, please call your surgeon's office.  Questions?   If you have any questions for your care team, list them here: _________________________________________________________________________________________________________________________________________________________________________ ____________________________________ ____________________________________ ____________________________________  For informational purposes only. Not to replace the advice of your health care provider. Copyright   2003, 2019 [employer] [facility]. All rights reserved. Clinically reviewed by Jolene Santo MD. [employer] 191944 - REV 12/22.    How to Take Your Medication Before Surgery  - HOLD (do not take) your HYDROCHLOROTHIAZIDE on the morning of surgery.   - HOLD (do not take) your METFORMIN on the morning of surgery.  - HOLD (do not take) Aspirin for 5 days  - HOLD (do not take) ozempic for the week before surgery

## 2024-04-18 ENCOUNTER — MYC REFILL (OUTPATIENT)
Dept: FAMILY MEDICINE | Facility: CLINIC | Age: 54
End: 2024-04-18
Payer: COMMERCIAL

## 2024-04-18 DIAGNOSIS — E11.9 TYPE 2 DIABETES MELLITUS WITHOUT COMPLICATION, WITHOUT LONG-TERM CURRENT USE OF INSULIN (H): ICD-10-CM

## 2024-04-18 DIAGNOSIS — E66.01 CLASS 3 SEVERE OBESITY DUE TO EXCESS CALORIES WITHOUT SERIOUS COMORBIDITY WITH BODY MASS INDEX (BMI) OF 40.0 TO 44.9 IN ADULT (H): ICD-10-CM

## 2024-04-18 DIAGNOSIS — E66.813 CLASS 3 SEVERE OBESITY DUE TO EXCESS CALORIES WITHOUT SERIOUS COMORBIDITY WITH BODY MASS INDEX (BMI) OF 40.0 TO 44.9 IN ADULT (H): ICD-10-CM

## 2024-06-29 DIAGNOSIS — R80.9 TYPE 2 DIABETES MELLITUS WITH MICROALBUMINURIA, WITHOUT LONG-TERM CURRENT USE OF INSULIN (H): ICD-10-CM

## 2024-06-29 DIAGNOSIS — E11.29 TYPE 2 DIABETES MELLITUS WITH MICROALBUMINURIA, WITHOUT LONG-TERM CURRENT USE OF INSULIN (H): ICD-10-CM

## 2024-06-29 DIAGNOSIS — E11.9 TYPE 2 DIABETES MELLITUS WITHOUT COMPLICATION, WITHOUT LONG-TERM CURRENT USE OF INSULIN (H): ICD-10-CM

## 2024-07-01 RX ORDER — METFORMIN HCL 500 MG
1000 TABLET, EXTENDED RELEASE 24 HR ORAL 2 TIMES DAILY
Qty: 360 TABLET | Refills: 0 | Status: SHIPPED | OUTPATIENT
Start: 2024-07-01

## 2024-07-01 RX ORDER — ATORVASTATIN CALCIUM 40 MG/1
40 TABLET, FILM COATED ORAL DAILY
Qty: 90 TABLET | Refills: 0 | Status: SHIPPED | OUTPATIENT
Start: 2024-07-01

## 2024-07-01 NOTE — TELEPHONE ENCOUNTER
Prescription approved per [facility] Refill Protocol.    Pending Prescriptions:                       Disp   Refills    atorvastatin (LIPITOR) 40 MG tablet [Phar*90 tab*3            Sig: TAKE 1 TABLET(40 MG) BY MOUTH DAILY    metFORMIN (GLUCOPHAGE XR) 500 MG 24 hr ta*360 ta*3            Sig: TAKE 2 TABLETS(1000 MG) BY MOUTH TWICE DAILY      Requested Prescriptions   Pending Prescriptions Disp Refills    atorvastatin (LIPITOR) 40 MG tablet [Pharmacy Med Name: ATORVASTATIN 40MG TABLETS] 90 tablet 3     Sig: TAKE 1 TABLET(40 MG) BY MOUTH DAILY       Antihyperlipidemic agents Passed - 6/29/2024  6:03 AM        Passed - LDL on file in the past 12 months        Passed - Medication is active on med list        Passed - Recent (12 mo) or future (90 days) visit within the authorizing provider's specialty     The patient must have completed an in-person or virtual visit within the past 12 months or has a future visit scheduled within the next 90 days with the authorizing provider s specialty.  Urgent care and e-visits do not quality as an office visit for this protocol.          Passed - Patient is age 18 years or older          metFORMIN (GLUCOPHAGE XR) 500 MG 24 hr tablet [Pharmacy Med Name: METFORMIN ER 500MG 24HR TABS] 360 tablet 3     Sig: TAKE 2 TABLETS(1000 MG) BY MOUTH TWICE DAILY       Biguanide Agents Passed - 6/29/2024  6:03 AM        Passed - Patient is age 10 or older        Passed - Patient has documented A1c within the specified period of time.     If HgbA1C is 8 or greater, it needs to be on file within the past 3 months.  If less than 8, must be on file within the past 6 months.     Recent Labs   Lab Test 04/04/24  0827   A1C 5.9*             Passed - Patient does NOT have a diagnosis of CHF.        Passed - Medication is active on med list        Passed - Medication indicated for associated diagnosis     Medication is associated with one or more of the following diagnoses:     Gestational diabetes mellitus      Hyperinsulinar obesity     Hypersecretion of ovarian androgens    Non-alcoholic fatty liver    Polycystic ovarian syndrome               Pre-diabetes (DM 2 prevention)    Type 2 diabetes mellitus     Weight gain, antipsychotic therapy-induced    Impaired fasting glucose          Passed - Has GFR on file in past 12 months and most recent value is normal        Passed - Recent (6 mo) or future (90 days) visit within the authorizing provider's specialty     The patient must have completed an in-person or virtual visit within the past 6 months or has a future visit scheduled within the next 90 days with the authorizing provider s specialty.  Urgent care and e-visits do not quality as an office visit for this protocol.             Tahmina Negro RN on 7/1/2024 at 12:12 PM

## 2024-07-21 ENCOUNTER — HEALTH MAINTENANCE LETTER (OUTPATIENT)
Age: 54
End: 2024-07-21

## 2024-08-22 ENCOUNTER — MYC MEDICAL ADVICE (OUTPATIENT)
Dept: FAMILY MEDICINE | Facility: CLINIC | Age: 54
End: 2024-08-22
Payer: COMMERCIAL

## 2024-08-22 DIAGNOSIS — E11.9 TYPE 2 DIABETES MELLITUS WITHOUT COMPLICATION, WITHOUT LONG-TERM CURRENT USE OF INSULIN (H): Primary | ICD-10-CM

## 2024-10-08 ENCOUNTER — MYC MEDICAL ADVICE (OUTPATIENT)
Dept: FAMILY MEDICINE | Facility: CLINIC | Age: 54
End: 2024-10-08

## 2024-10-08 ENCOUNTER — OFFICE VISIT (OUTPATIENT)
Dept: FAMILY MEDICINE | Facility: CLINIC | Age: 54
End: 2024-10-08
Payer: COMMERCIAL

## 2024-10-08 VITALS
DIASTOLIC BLOOD PRESSURE: 86 MMHG | HEART RATE: 82 BPM | TEMPERATURE: 97.8 F | WEIGHT: 258 LBS | SYSTOLIC BLOOD PRESSURE: 132 MMHG | BODY MASS INDEX: 36.94 KG/M2 | HEIGHT: 70 IN | RESPIRATION RATE: 18 BRPM | OXYGEN SATURATION: 98 %

## 2024-10-08 DIAGNOSIS — E11.29 TYPE 2 DIABETES MELLITUS WITH MICROALBUMINURIA, WITHOUT LONG-TERM CURRENT USE OF INSULIN (H): ICD-10-CM

## 2024-10-08 DIAGNOSIS — R80.9 TYPE 2 DIABETES MELLITUS WITH MICROALBUMINURIA, WITHOUT LONG-TERM CURRENT USE OF INSULIN (H): ICD-10-CM

## 2024-10-08 DIAGNOSIS — E66.813 CLASS 3 SEVERE OBESITY DUE TO EXCESS CALORIES WITHOUT SERIOUS COMORBIDITY WITH BODY MASS INDEX (BMI) OF 40.0 TO 44.9 IN ADULT (H): ICD-10-CM

## 2024-10-08 DIAGNOSIS — D12.6 ADENOMATOUS POLYP OF COLON, UNSPECIFIED PART OF COLON: ICD-10-CM

## 2024-10-08 DIAGNOSIS — E11.9 TYPE 2 DIABETES MELLITUS WITHOUT COMPLICATION, WITHOUT LONG-TERM CURRENT USE OF INSULIN (H): Primary | ICD-10-CM

## 2024-10-08 DIAGNOSIS — Z01.818 PREOP GENERAL PHYSICAL EXAM: Primary | ICD-10-CM

## 2024-10-08 DIAGNOSIS — E66.01 CLASS 3 SEVERE OBESITY DUE TO EXCESS CALORIES WITHOUT SERIOUS COMORBIDITY WITH BODY MASS INDEX (BMI) OF 40.0 TO 44.9 IN ADULT (H): ICD-10-CM

## 2024-10-08 LAB
CREAT UR-MCNC: 118.5 MG/DL
EST. AVERAGE GLUCOSE BLD GHB EST-MCNC: 117 MG/DL
HBA1C MFR BLD: 5.7 % (ref 0–5.6)
HGB BLD-MCNC: 15.2 G/DL (ref 13.3–17.7)
HOLD SPECIMEN: NORMAL
MICROALBUMIN UR-MCNC: 23.3 MG/L
MICROALBUMIN/CREAT UR: 19.66 MG/G CR (ref 0–17)

## 2024-10-08 PROCEDURE — 82043 UR ALBUMIN QUANTITATIVE: CPT | Performed by: NURSE PRACTITIONER

## 2024-10-08 PROCEDURE — 83036 HEMOGLOBIN GLYCOSYLATED A1C: CPT | Performed by: NURSE PRACTITIONER

## 2024-10-08 PROCEDURE — 85018 HEMOGLOBIN: CPT | Performed by: NURSE PRACTITIONER

## 2024-10-08 PROCEDURE — 90471 IMMUNIZATION ADMIN: CPT | Performed by: NURSE PRACTITIONER

## 2024-10-08 PROCEDURE — 36415 COLL VENOUS BLD VENIPUNCTURE: CPT | Performed by: NURSE PRACTITIONER

## 2024-10-08 PROCEDURE — 90715 TDAP VACCINE 7 YRS/> IM: CPT | Performed by: NURSE PRACTITIONER

## 2024-10-08 PROCEDURE — 99214 OFFICE O/P EST MOD 30 MIN: CPT | Mod: 25 | Performed by: NURSE PRACTITIONER

## 2024-10-08 PROCEDURE — 99207 PR FOOT EXAM NO CHARGE: CPT | Performed by: NURSE PRACTITIONER

## 2024-10-08 PROCEDURE — 82570 ASSAY OF URINE CREATININE: CPT | Performed by: NURSE PRACTITIONER

## 2024-10-08 ASSESSMENT — PAIN SCALES - GENERAL: PAINLEVEL: NO PAIN (0)

## 2024-10-08 NOTE — PROGRESS NOTES
Preoperative Evaluation  [facility]  [address]  [facility] [address]  Phone: [phone]  Primary Provider: Physician No Ref-Primary  Pre-op Performing Provider: JUDE Clarke CNP  Oct 8, 2024             10/4/2024   Surgical Information   What procedure is being done? pre op   Facility or Hospital where procedure/surgery will be performed: [facility]   Who is doing the procedure / surgery? Dr. Meyer?   Date of surgery / procedure: 11/5/2024   Time of surgery / procedure: 11:00am   Where do you plan to recover after surgery? at home with family        Fax number for surgical facility: Note does not need to be faxed, will be available electronically in Epic.    Assessment & Plan     The proposed surgical procedure is considered LOW risk.    Preop general physical exam  Adenomatous polyp of colon, unspecified part of colon  Cleared for upcoming procedure. Labs updated as below. Chronic conditions stable. Reviewed medications and advised how to take for upcoming surgery  - Hemoglobin with Reflex to Iron Studies; Future  - Hemoglobin with Reflex to Iron Studies    Type 2 diabetes mellitus with microalbuminuria, without long-term current use of insulin (H)  Doing very well on management of diabetes. Has noted significant improvements of his health with improved management of his diabetes.   - OPTOMETRY REFERRAL; Future  - Albumin Random Urine Quantitative with Creat Ratio; Future  - HEMOGLOBIN A1C; Future  - FOOT EXAM  - Albumin Random Urine Quantitative with Creat Ratio  - HEMOGLOBIN A1C  - semaglutide (OZEMPIC) 2 MG/3ML pen; Inject 0.5 mg subcutaneously every 7 days.    Class 3 severe obesity due to excess calories without serious comorbidity with body mass index (BMI) of 40.0 to 44.9 in adult (H)  Improving, BMI today is 37.02            - No identified additional risk factors other than previously addressed    Preoperative Medication Instructions  Antiplatelet  or Anticoagulation Medication Instructions   - aspirin: Discontinue aspirin 7-10 days prior to procedure to reduce bleeding risk. It should be resumed postoperatively.     Additional Medication Instructions   - ACE/ARB: DO NOT TAKE on day of surgery (minimum 11 hours for general anesthesia).   - Statins: Continue taking on the day of surgery.    - metformin: DO NOT TAKE day of surgery.   - GLP-1 oral semaglutide: DO NOT TAKE 7 days before surgery    Recommendation  Approval given to proceed with proposed procedure, without further diagnostic evaluation.    Rubens Green is a 54 year old, presenting for the following:  Pre-Op Exam          10/8/2024     7:49 AM   Additional Questions   Roomed by Addie ESCALERA MA     HPI related to upcoming procedure: repeat colonoscopy due to serrated sessle polyp removal, 30mm in size .        10/4/2024   Pre-Op Questionnaire   Have you ever had a heart attack or stroke? No   Have you ever had surgery on your heart or blood vessels, such as a stent placement, a coronary artery bypass, or surgery on an artery in your head, neck, heart, or legs? No   Do you have chest pain with activity? No   Do you have a history of heart failure? No   Do you currently have a cold, bronchitis or symptoms of other infection? No   Do you have a cough, shortness of breath, or wheezing? No   Do you or anyone in your family have previous history of blood clots? No   Do you or does anyone in your family have a serious bleeding problem such as prolonged bleeding following surgeries or cuts? No   Have you ever had problems with anemia or been told to take iron pills? No   Have you had any abnormal blood loss such as black, tarry or bloody stools? No   Have you ever had a blood transfusion? No   Are you willing to have a blood transfusion if it is medically needed before, during, or after your surgery? Yes   Have you or any of your relatives ever had problems with anesthesia? No   Do you have sleep apnea,  excessive snoring or daytime drowsiness? No   Do you have any artifical heart valves or other implanted medical devices like a pacemaker, defibrillator, or continuous glucose monitor? No   Do you have artificial joints? No   Are you allergic to latex? No        Health Care Directive  Patient does not have a Health Care Directive or Living Will: Discussed advance care planning with patient; however, patient declined at this time.    Preoperative Review of    reviewed - no record of controlled substances prescribed.      Status of Chronic Conditions:  DIABETES - Patient has a longstanding history of DiabetesType Type II . Patient is being treated with GLP-1 and denies significant side effects. Control has been good. Complicating factors include but are not limited to: hypertension and hyperlipidemia.     HYPERTENSION - Patient has longstanding history of HTN , currently denies any symptoms referable to elevated blood pressure. Specifically denies chest pain, palpitations, dyspnea, orthopnea, PND or peripheral edema. Blood pressure readings have been in normal range. Current medication regimen is as listed below. Patient denies any side effects of medication.     Patient Active Problem List    Diagnosis Date Noted    Type 2 diabetes mellitus with microalbuminuria, without long-term current use of insulin (H) 03/04/2021     Priority: Medium    Prediabetes 03/27/2019     Priority: Medium    Morbid obesity (H) 03/25/2019     Priority: Medium    Peripheral Neuropathy      Priority: Medium     Created by Conversion  Replacement Utility updated for latest IMO load        Obesity      Priority: Medium     Created by Conversion        Benign Essential Hypertension      Priority: Medium     Created by Conversion        Carpal Tunnel Syndrome      Priority: Medium     Created by Conversion        Allergies      Priority: Medium     Created by Conversion          Past Medical History:   Diagnosis Date    Carpal tunnel  "syndrome     Created by Conversion     Essential hypertension, benign     Created by Conversion     Hereditary and idiopathic peripheral neuropathy     Created by Conversion  Replacement Utility updated for latest IMO load    Obesity, unspecified     Created by Conversion     Other allergy, other than to medicinal agents     Created by Conversion      No past surgical history on file.  Current Outpatient Medications   Medication Sig Dispense Refill    atorvastatin (LIPITOR) 40 MG tablet TAKE 1 TABLET(40 MG) BY MOUTH DAILY 90 tablet 0    losartan (COZAAR) 50 MG tablet Take 1 tablet (50 mg) by mouth daily 90 tablet 2    semaglutide (OZEMPIC) 2 MG/3ML pen Inject 0.5 mg Subcutaneous every 7 days 12 mL 1    meloxicam (MOBIC) 7.5 MG tablet Take 1 tablet (7.5 mg) by mouth daily (Patient not taking: Reported on 9/11/2023) 30 tablet 1    metFORMIN (GLUCOPHAGE XR) 500 MG 24 hr tablet TAKE 2 TABLETS(1000 MG) BY MOUTH TWICE DAILY (Patient not taking: Reported on 10/8/2024) 360 tablet 0       Allergies   Allergen Reactions    Lisinopril Unknown     Cough        Social History     Tobacco Use    Smoking status: Never    Smokeless tobacco: Never   Substance Use Topics    Alcohol use: Yes     Comment: social     Family History   Problem Relation Age of Onset    Diabetes Brother      History   Drug Use Unknown             Review of Systems  Constitutional, neuro, ENT, endocrine, pulmonary, cardiac, gastrointestinal, genitourinary, musculoskeletal, integument and psychiatric systems are negative, except as otherwise noted.    Objective    /86   Pulse 82   Temp 97.8  F (36.6  C) (Tympanic)   Resp 18   Ht 1.778 m (5' 10\")   Wt 117 kg (258 lb)   SpO2 98%   BMI 37.02 kg/m     Estimated body mass index is 37.02 kg/m  as calculated from the following:    Height as of this encounter: 1.778 m (5' 10\").    Weight as of this encounter: 117 kg (258 lb).    Physical Exam  GENERAL: alert and no distress  EYES: Eyes grossly normal to " inspection, PERRL and conjunctivae and sclerae normal  HENT: ear canals and TM's normal, nose and mouth without ulcers or lesions  NECK: no adenopathy, no asymmetry, masses, or scars  RESP: lungs clear to auscultation - no rales, rhonchi or wheezes  CV: regular rate and rhythm, normal S1 S2, no S3 or S4, no murmur, click or rub, no peripheral edema  ABDOMEN: soft, nontender, no hepatosplenomegaly, no masses and bowel sounds normal  MS: no gross musculoskeletal defects noted, no edema  SKIN: no suspicious lesions or rashes  NEURO: Normal strength and tone, mentation intact and speech normal  PSYCH: mentation appears normal, affect normal/bright    Recent Labs   Lab Test 04/04/24  0827 11/20/23  1421   NA  --  139   POTASSIUM  --  4.5   CR  --  0.81   A1C 5.9* 6.1*        Diagnostics  Labs pending at this time.  Results will be reviewed when available.   No EKG required for low risk surgery (cataract, skin procedure, breast biopsy, etc).    Revised Cardiac Risk Index (RCRI)  The patient has the following serious cardiovascular risks for perioperative complications:   - No serious cardiac risks = 0 points     RCRI Interpretation: 0 points: Class I (very low risk - 0.4% complication rate)         Signed Electronically by: JUDE Clarke CNP  A copy of this evaluation report is provided to the requesting physician.

## 2024-10-08 NOTE — PATIENT INSTRUCTIONS
How to Take Your Medication Before Surgery  Preoperative Medication Instructions   Antiplatelet or Anticoagulation Medication Instructions   - aspirin: Discontinue aspirin 7-10 days prior to procedure to reduce bleeding risk. It should be resumed postoperatively.     Additional Medication Instructions   - ACE/ARB: DO NOT TAKE on day of surgery (minimum 11 hours for general anesthesia).   - Statins: Continue taking on the day of surgery.    - metformin: DO NOT TAKE day of surgery.   - GLP-1 oral semaglutide: DO NOT TAKE 7 days before surgery       Patient Education   Preparing for Your Surgery  For Adults  Getting started  In most cases, a nurse will call to review your health history and instructions. They will give you an arrival time based on your scheduled surgery time. Please be ready to share:  Your doctor's clinic name and phone number  Your medical, surgical, and anesthesia history  A list of allergies and sensitivities  A list of medicines, including herbal treatments and over-the-counter drugs  Whether the patient has a legal guardian (ask how to send us the papers in advance)  Note: You may not receive a call if you were seen at our PAC (Preoperative Assessment Center).  Please tell us if you're pregnant--or if there's any chance you might be pregnant. Some surgeries may injure a fetus (unborn baby), so they require a pregnancy test. Surgeries that are safe for a fetus don't always need a test, and you can choose whether to have one.   Preparing for surgery  Within 10 to 30 days of surgery: Have a pre-op exam (sometimes called an H&P, or History and Physical). This can be done at a clinic or pre-operative center.  If you're having a , you may not need this exam. Talk to your care team.  At your pre-op exam, talk to your care team about all medicines you take. (This includes CBD oil and any drugs, such as THC, marijuana, and other forms of cannabis.) If you need to stop any medicine before surgery,  ask when to start taking it again.  This is for your safety. Many medicines and drugs can make you bleed too much during surgery. Some change how well surgery (anesthesia) drugs work.  Call your insurance company to let them know you're having surgery. (If you don't have insurance, call [phone].)  Call your clinic if there's any change in your health. This includes a scrape or scratch near the surgery site, or any signs of a cold (sore throat, runny nose, cough, rash, fever).  Eating and drinking guidelines  For your safety: Unless your surgeon tells you otherwise, follow the guidelines below.  Eat and drink as normal until 8 hours before you arrive for surgery. After that, no food or milk. You can spit out gum when you arrive.  Drink clear liquids until 2 hours before you arrive. These are liquids you can see through, like water, Gatorade, and Propel Water. They also include plain black coffee and tea (no cream or milk).  No alcohol for 24 hours before you arrive. The night before surgery, stop any drinks that contain THC.  If your care team tells you to take medicine on the morning of surgery, it's okay to take it with a sip of water. No other medicines or drugs are allowed (including CBD oil)--follow your care team's instructions.  If you have questions the day of surgery, call your hospital or surgery center.   Preventing infection  Shower or bathe the night before and the morning of surgery. Follow the instructions your clinic gave you. (If no instructions, use regular soap.)  Don't shave or clip hair near your surgery site. We'll remove the hair if needed.  Don't smoke or vape the morning of surgery. No chewing tobacco for 6 hours before you arrive. A nicotine patch is okay. You may spit out nicotine gum when you arrive.  For some surgeries, the surgeon will tell you to fully quit smoking and nicotine.  We will make every effort to keep you safe from infection. We will:  Clean our hands often with soap  and water (or an alcohol-based hand rub).  Clean the skin at your surgery site with a special soap that kills germs.  Give you a special gown to keep you warm. (Cold raises the risk of infection.)  Wear hair covers, masks, gowns, and gloves during surgery.  Give antibiotic medicine, if prescribed. Not all surgeries need this medicine.  What to bring on the day of surgery  Photo ID and insurance card  Copy of your health care directive, if you have one  Glasses and hearing aids (bring cases)  You can't wear contacts during surgery  Inhaler and eye drops, if you use them (tell us about these when you arrive)  CPAP machine or breathing device, if you use them  A few personal items, if spending the night  If you have . . .  A pacemaker, ICD (cardiac defibrillator), or other implant: Bring the ID card.  An implanted stimulator: Bring the remote control.  A legal guardian: Bring a copy of the certified (court-stamped) guardianship papers.  Please remove any jewelry, including body piercings. Leave jewelry and other valuables at home.  If you're going home the day of surgery  You must have a responsible adult drive you home. They should stay with you overnight as well.  If you don't have someone to stay with you, and you aren't safe to go home alone, we may keep you overnight. Insurance often won't pay for this.  After surgery  If it's hard to control your pain or you need more pain medicine, please call your surgeon's office.  Questions?   If you have any questions for your care team, list them here:   ____________________________________________________________________________________________________________________________________________________________________________________________________________________________________________________________  For informational purposes only. Not to replace the advice of your health care provider. Copyright   2003, 2019 [facility]. All rights reserved. Clinically  reviewed by Devan Condon MD. [employer] [id number] - REV 08/24.

## 2024-10-16 DIAGNOSIS — I10 ESSENTIAL HYPERTENSION: ICD-10-CM

## 2024-10-17 RX ORDER — LOSARTAN POTASSIUM 50 MG/1
50 TABLET ORAL DAILY
Qty: 90 TABLET | Refills: 2 | Status: SHIPPED | OUTPATIENT
Start: 2024-10-17

## 2024-10-21 ENCOUNTER — MYC REFILL (OUTPATIENT)
Dept: FAMILY MEDICINE | Facility: CLINIC | Age: 54
End: 2024-10-21
Payer: COMMERCIAL

## 2024-10-21 DIAGNOSIS — R80.9 TYPE 2 DIABETES MELLITUS WITH MICROALBUMINURIA, WITHOUT LONG-TERM CURRENT USE OF INSULIN (H): ICD-10-CM

## 2024-10-21 DIAGNOSIS — E11.29 TYPE 2 DIABETES MELLITUS WITH MICROALBUMINURIA, WITHOUT LONG-TERM CURRENT USE OF INSULIN (H): ICD-10-CM

## 2024-10-22 RX ORDER — METFORMIN HYDROCHLORIDE 500 MG/1
1000 TABLET, EXTENDED RELEASE ORAL 2 TIMES DAILY
Qty: 360 TABLET | Refills: 1 | Status: SHIPPED | OUTPATIENT
Start: 2024-10-22

## 2024-11-11 ENCOUNTER — TRANSFERRED RECORDS (OUTPATIENT)
Dept: HEALTH INFORMATION MANAGEMENT | Facility: CLINIC | Age: 54
End: 2024-11-11
Payer: COMMERCIAL

## 2024-11-11 LAB — RETINOPATHY: NEGATIVE

## 2024-11-12 NOTE — TELEPHONE ENCOUNTER
Prescription approved per [facility] Refill Protocol.   The patient has been examined and the H&P has been reviewed:    I concur with the findings and no changes have occurred since H&P was written.    Surgery risks, benefits and alternative options discussed and understood by patient/family.          There are no hospital problems to display for this patient.

## 2024-12-03 ENCOUNTER — MYC MEDICAL ADVICE (OUTPATIENT)
Dept: FAMILY MEDICINE | Facility: CLINIC | Age: 54
End: 2024-12-03
Payer: COMMERCIAL

## 2024-12-07 ENCOUNTER — HEALTH MAINTENANCE LETTER (OUTPATIENT)
Age: 54
End: 2024-12-07

## 2024-12-26 ENCOUNTER — OFFICE VISIT (OUTPATIENT)
Dept: FAMILY MEDICINE | Facility: CLINIC | Age: 54
End: 2024-12-26
Payer: COMMERCIAL

## 2024-12-26 VITALS
SYSTOLIC BLOOD PRESSURE: 120 MMHG | TEMPERATURE: 98.4 F | HEIGHT: 70 IN | WEIGHT: 248 LBS | HEART RATE: 98 BPM | DIASTOLIC BLOOD PRESSURE: 76 MMHG | BODY MASS INDEX: 35.5 KG/M2 | RESPIRATION RATE: 20 BRPM

## 2024-12-26 DIAGNOSIS — Z90.49 S/P PARTIAL RESECTION OF COLON: Primary | ICD-10-CM

## 2024-12-26 DIAGNOSIS — R30.0 DYSURIA: ICD-10-CM

## 2024-12-26 LAB
ALBUMIN SERPL BCG-MCNC: 4.2 G/DL (ref 3.5–5.2)
ALBUMIN UR-MCNC: NEGATIVE MG/DL
ALP SERPL-CCNC: 102 U/L (ref 40–150)
ALT SERPL W P-5'-P-CCNC: 48 U/L (ref 0–70)
ANION GAP SERPL CALCULATED.3IONS-SCNC: 10 MMOL/L (ref 7–15)
APPEARANCE UR: CLEAR
AST SERPL W P-5'-P-CCNC: 25 U/L (ref 0–45)
BACTERIA #/AREA URNS HPF: ABNORMAL /HPF
BILIRUB SERPL-MCNC: 0.5 MG/DL
BILIRUB UR QL STRIP: NEGATIVE
BUN SERPL-MCNC: 11.9 MG/DL (ref 6–20)
CALCIUM SERPL-MCNC: 9.6 MG/DL (ref 8.8–10.4)
CHLORIDE SERPL-SCNC: 102 MMOL/L (ref 98–107)
COLOR UR AUTO: YELLOW
CREAT SERPL-MCNC: 0.73 MG/DL (ref 0.67–1.17)
EGFRCR SERPLBLD CKD-EPI 2021: >90 ML/MIN/1.73M2
ERYTHROCYTE [DISTWIDTH] IN BLOOD BY AUTOMATED COUNT: 12.3 % (ref 10–15)
GLUCOSE SERPL-MCNC: 80 MG/DL (ref 70–99)
GLUCOSE UR STRIP-MCNC: NEGATIVE MG/DL
HCO3 SERPL-SCNC: 28 MMOL/L (ref 22–29)
HCT VFR BLD AUTO: 44.8 % (ref 40–53)
HGB BLD-MCNC: 15.1 G/DL (ref 13.3–17.7)
HGB UR QL STRIP: NEGATIVE
KETONES UR STRIP-MCNC: NEGATIVE MG/DL
LEUKOCYTE ESTERASE UR QL STRIP: NEGATIVE
MCH RBC QN AUTO: 31.9 PG (ref 26.5–33)
MCHC RBC AUTO-ENTMCNC: 33.7 G/DL (ref 31.5–36.5)
MCV RBC AUTO: 95 FL (ref 78–100)
NITRATE UR QL: NEGATIVE
PH UR STRIP: 6 [PH] (ref 5–7)
PLATELET # BLD AUTO: 258 10E3/UL (ref 150–450)
POTASSIUM SERPL-SCNC: 4 MMOL/L (ref 3.4–5.3)
PROT SERPL-MCNC: 7.2 G/DL (ref 6.4–8.3)
RBC # BLD AUTO: 4.74 10E6/UL (ref 4.4–5.9)
RBC #/AREA URNS AUTO: ABNORMAL /HPF
SODIUM SERPL-SCNC: 140 MMOL/L (ref 135–145)
SP GR UR STRIP: >=1.03 (ref 1–1.03)
SQUAMOUS #/AREA URNS AUTO: ABNORMAL /LPF
UROBILINOGEN UR STRIP-ACNC: 0.2 E.U./DL
WBC # BLD AUTO: 12.2 10E3/UL (ref 4–11)
WBC #/AREA URNS AUTO: ABNORMAL /HPF

## 2024-12-26 RX ORDER — CYCLOBENZAPRINE HCL 10 MG
10 TABLET ORAL 3 TIMES DAILY PRN
Qty: 30 TABLET | Refills: 1 | Status: SHIPPED | OUTPATIENT
Start: 2024-12-26

## 2024-12-26 RX ORDER — OXYCODONE HYDROCHLORIDE 5 MG/1
5-10 TABLET ORAL EVERY 6 HOURS PRN
Qty: 8 TABLET | Refills: 0 | Status: SHIPPED | OUTPATIENT
Start: 2024-12-26

## 2024-12-26 RX ORDER — CYCLOBENZAPRINE HCL 10 MG
10 TABLET ORAL
COMMUNITY
Start: 2024-12-23 | End: 2024-12-26

## 2024-12-26 RX ORDER — ACETAMINOPHEN 500 MG
1000 TABLET ORAL EVERY 6 HOURS
COMMUNITY
Start: 2024-12-20

## 2024-12-26 RX ORDER — OXYCODONE HYDROCHLORIDE 5 MG/1
5-10 TABLET ORAL
COMMUNITY
Start: 2024-12-23 | End: 2024-12-26

## 2024-12-26 ASSESSMENT — PAIN SCALES - GENERAL: PAINLEVEL_OUTOF10: EXTREME PAIN (9)

## 2024-12-26 NOTE — PATIENT INSTRUCTIONS
You have a post operative appointment with Dr. Edmond Heart on 1/7/2025 at 11:00AM at the [place] office of [facility], located at [address] [place] [address] [address] [address]. Please arrive 15 minutes early. If you need to reschedule this appointment, or if you have any post operative questions or concerns, please call the Colorectal Surgery office at [phone].   When to follow up: 2 to 4 weeks   When is patient being discharged?: Other/Unknown   Primary Care Provider follow up appointment(s)   You have been scheduled to see Vonda Garcia MD, because your primary provider is not available during the recommended time frame. Appointment scheduled on Thursday December 26th,2024 at 2:10 pm. If any questions or concerns call [facility] at [phone].   When to follow up: 1 to 5 days   Regular diet:   On discharge, it is ok for you to eat a regular diet.  It is helpful to use the following list as a guideline, but once you are tolerating these lower fiber foods, please start to add back raw fruits and vegetables - typically patients can start doing this within a week of discharge. Then continue to advance your diet as tolerated.  - 5-6 small meals daily, about every 3-4 hours  - foods that are soft/moist, bland, and easy to chew and easy to swallow  - tender, well-cooked lean meats such as beef, fish, lamb, pork or poultry (chicken and turkey)  - dairy products (if tolerated)   - soft canned fruits, melons and ripe bananas  - well-cooked, easy-to-cut (with a fork) vegetables.  - refined or white flour products, such as white bread, white pasta and cream of wheat  - mashed potatoes   Up as tolerated   It is important to slowly return to your regular level of activity. Start with 5-10 minutes at one time and slowly build to 30 minutes at one time. Save your energy by spreading out activities that make you tired. Rest as needed.   Up as tolerated   It is important  to slowly return to your regular level of activity. Start with 5-10 minutes at one time and slowly build to 30 minutes at one time. Save your energy by spreading out activities that make you tired. Rest as needed.

## 2024-12-26 NOTE — PROGRESS NOTES
"  Assessment & Plan     S/P partial resection of colon  Tender abdomen, but no signs of rebound tenderness. He needs to start taking tylenol scheduled and using his muscle relaxant, Oxycodone for severe breakthrough pain. If worsening pain please be re-evalauted.   - cyclobenzaprine (FLEXERIL) 10 MG tablet  Dispense: 30 tablet; Refill: 1  - oxyCODONE (ROXICODONE) 5 MG tablet  Dispense: 8 tablet; Refill: 0  - CBC with platelets  - Comprehensive metabolic panel (BMP + Alb, Alk Phos, ALT, AST, Total. Bili, TP)  - CBC with platelets  - Comprehensive metabolic panel (BMP + Alb, Alk Phos, ALT, AST, Total. Bili, TP)    Dysuria  ? Of not drinking enough as he has been in so much pain, will rule out infection  - UA with Microscopic reflex to Culture - lab collect  - UA with Microscopic reflex to Culture - lab collect  - UA Microscopic with Reflex to Culture          MED REC REQUIRED{  Post Medication Reconciliation Status:     BMI  Estimated body mass index is 35.58 kg/m  as calculated from the following:    Height as of this encounter: 1.778 m (5' 10\").    Weight as of this encounter: 112.5 kg (248 lb).             Rubens Green is a 54 year old, presenting for the following health issues:  Hospital F/U        12/26/2024     2:01 PM   Additional Questions   Roomed by Jaja LEOS         Hospital Follow-up Visit:    Hospital/Nursing Home/IP Rehab Facility:  [facility]   Date of Admission: 12/19/2024  Date of Discharge: 12/23/2024  Reason(s) for Admission: Ascending colon polyps  Was the patient in the ICU or did the patient experience delirium during hospitalization?  No  Do you have any other stressors you would like to discuss with your provider? No    Problems taking medications regularly:  None  Medication changes since discharge: acetaminophen 500 mg tablet  TYLENOL EXTRA STRGTH  Take 2 Tablets (1,000 mg) by mouth every 6 hours. Max acetaminophen dose: 4000mg in 24 hrs.    cyclobenzaprine 10 mg tablet  Commonly " "known as: FLEXERIL  Take 1 Tablet (10 mg) by mouth three times daily.    oxyCODONE 5 mg immediate release tablet  Commonly known as: ROXICODONE  Take 1-2 Tablets (5-10 mg) by mouth every 4 hours if needed for Pain (For moderate pain. 2nd choice for pain.).   Problems adhering to non-medication therapy:  None    Summary of hospitalization:  CareEverywhere information obtained and reviewed  Diagnostic Tests/Treatments reviewed.  Follow up needed: Colorectal surgery  Other Healthcare Providers Involved in Patient s Care:         None  Update since discharge: fluctuating course.    Discharge on 12/23 after partial colectomy. No bowel movement yesterday. Not eating much or drinking. Abdominal pain is worse.      Plan of care communicated with patient and family         Review of Systems  Constitutional, HEENT, cardiovascular, pulmonary, gi and gu systems are negative, except as otherwise noted.      Objective    /76   Pulse 98   Temp 98.4  F (36.9  C)   Resp 20   Ht 1.778 m (5' 10\")   Wt 112.5 kg (248 lb)   BMI 35.58 kg/m    Body mass index is 35.58 kg/m .  Physical Exam  Constitutional:       Appearance: Normal appearance.   Cardiovascular:      Rate and Rhythm: Normal rate and regular rhythm.   Pulmonary:      Effort: Pulmonary effort is normal.      Breath sounds: Normal breath sounds.   Abdominal:      General: Bowel sounds are normal. There is no distension.      Tenderness: There is abdominal tenderness. There is no right CVA tenderness, left CVA tenderness, guarding or rebound.   Skin:     Comments: Incisions with no signs of infection   Neurological:      Mental Status: He is alert.            Signed Electronically by: NIECY MAR DO    "

## 2025-01-18 ENCOUNTER — HEALTH MAINTENANCE LETTER (OUTPATIENT)
Age: 55
End: 2025-01-18

## 2025-03-11 ENCOUNTER — HOSPITAL ENCOUNTER (EMERGENCY)
Facility: CLINIC | Age: 55
Discharge: HOME | End: 2025-03-11
Payer: COMMERCIAL

## 2025-03-11 ENCOUNTER — TELEPHONE (OUTPATIENT)
Dept: FAMILY MEDICINE | Facility: CLINIC | Age: 55
End: 2025-03-11

## 2025-03-11 ENCOUNTER — APPOINTMENT (OUTPATIENT)
Dept: GENERAL RADIOLOGY | Facility: CLINIC | Age: 55
End: 2025-03-11
Payer: COMMERCIAL

## 2025-03-11 VITALS
DIASTOLIC BLOOD PRESSURE: 120 MMHG | HEART RATE: 78 BPM | TEMPERATURE: 97.9 F | SYSTOLIC BLOOD PRESSURE: 162 MMHG | OXYGEN SATURATION: 98 % | RESPIRATION RATE: 18 BRPM

## 2025-03-11 DIAGNOSIS — R09.A2 GLOBUS SENSATION: ICD-10-CM

## 2025-03-11 PROCEDURE — 99213 OFFICE O/P EST LOW 20 MIN: CPT

## 2025-03-11 PROCEDURE — G0463 HOSPITAL OUTPT CLINIC VISIT: HCPCS

## 2025-03-11 PROCEDURE — 70360 X-RAY EXAM OF NECK: CPT

## 2025-03-11 RX ORDER — PREDNISONE 20 MG/1
TABLET ORAL
Qty: 10 TABLET | Refills: 0 | Status: SHIPPED | OUTPATIENT
Start: 2025-03-11

## 2025-03-11 ASSESSMENT — COLUMBIA-SUICIDE SEVERITY RATING SCALE - C-SSRS
6. HAVE YOU EVER DONE ANYTHING, STARTED TO DO ANYTHING, OR PREPARED TO DO ANYTHING TO END YOUR LIFE?: NO
1. IN THE PAST MONTH, HAVE YOU WISHED YOU WERE DEAD OR WISHED YOU COULD GO TO SLEEP AND NOT WAKE UP?: NO
2. HAVE YOU ACTUALLY HAD ANY THOUGHTS OF KILLING YOURSELF IN THE PAST MONTH?: NO

## 2025-03-11 ASSESSMENT — ACTIVITIES OF DAILY LIVING (ADL)
ADLS_ACUITY_SCORE: 41
ADLS_ACUITY_SCORE: 41

## 2025-03-11 NOTE — TELEPHONE ENCOUNTER
Received call from Patient and his wife.  For 3 days patient has had a swollen throat, fells like there is something there plugging his throat.  Denies swollen glands, pain and fever.  No appointments available in [facility] or [facility].  Recommended that Patient go to .  ]will go to [facility] [place].    Talha FALK, Clinic RN   [facility]

## 2025-03-11 NOTE — DISCHARGE INSTRUCTIONS
Start Augmentin twice daily for 10 days along with prednisone once daily for the next 5 days.  Symptoms should start improving over the next 2 to 3 days.  If you are unable to swallow, you develop skin changes or fever please follow-up in ER.

## 2025-03-11 NOTE — ED PROVIDER NOTES
History     Chief Complaint   Patient presents with    Throat Pain     HPI  Peter Arora is a 54 year old male who ***    Allergies:  Allergies   Allergen Reactions    Lisinopril Unknown     Cough       Problem List:    Patient Active Problem List    Diagnosis Date Noted    Type 2 diabetes mellitus with microalbuminuria, without long-term current use of insulin (H) 03/04/2021     Priority: Medium    Prediabetes 03/27/2019     Priority: Medium    Morbid obesity (H) 03/25/2019     Priority: Medium    Peripheral Neuropathy      Priority: Medium     Created by Conversion  Replacement Utility updated for latest IMO load        Obesity      Priority: Medium     Created by Conversion        Benign Essential Hypertension      Priority: Medium     Created by Conversion        Carpal Tunnel Syndrome      Priority: Medium     Created by Conversion        Allergies      Priority: Medium     Created by Conversion            Past Medical History:    Past Medical History:   Diagnosis Date    Carpal tunnel syndrome     Essential hypertension, benign     Hereditary and idiopathic peripheral neuropathy     Obesity, unspecified     Other allergy, other than to medicinal agents        Past Surgical History:    No past surgical history on file.    Family History:    Family History   Problem Relation Age of Onset    Diabetes Brother        Social History:  Marital Status:   [2]  Social History     Tobacco Use    Smoking status: Never    Smokeless tobacco: Never   Vaping Use    Vaping status: Never Used   Substance Use Topics    Alcohol use: Yes     Comment: social    Drug use: Never        Medications:    acetaminophen (TYLENOL) 500 MG tablet  atorvastatin (LIPITOR) 40 MG tablet  cyclobenzaprine (FLEXERIL) 10 MG tablet  losartan (COZAAR) 50 MG tablet  meloxicam (MOBIC) 7.5 MG tablet  metFORMIN (GLUCOPHAGE XR) 500 MG 24 hr tablet  neomycin (MYCIFRADIN) 500 MG tablet  ondansetron (ZOFRAN ODT) 4 MG ODT tab  oxyCODONE  (ROXICODONE) 5 MG tablet  Semaglutide, 2 MG/DOSE, (OZEMPIC) 8 MG/3ML pen          Review of Systems   All other systems reviewed and are negative.      Physical Exam   BP: (!) 162/120  Pulse: 78  Temp: 97.9  F (36.6  C)  Resp: 18  SpO2: 98 %      Physical Exam    ED Course        Procedures        No results found for this or any previous visit (from the past 24 hours).    Medications - No data to display    Assessments & Plan (with Medical Decision Making)     I have reviewed the nursing notes.    I have reviewed the findings, diagnosis, plan and need for follow up with the patient.        Medical Decision Making                        Prior to making a final disposition on this patient the results of patient's tests and other diagnostic studies were discussed with the patient. All questions were answered. Patient expressed understanding of the plan and was amenable to it.     Disclaimer: This note consists of symbols derived from keyboarding, dictation and/or voice recognition software. As a result, there may be errors in the script that have gone undetected. Please consider this when interpreting information found in this chart.      New Prescriptions    No medications on file       Final diagnoses:   None       3/11/2025   [facility] EMERGENCY DEPT     reports acute onset of globus sensation over the last 4 days.  Patient reports it feels like his throat is swelling and that there is something plugging it.  Patient denies fever, chills, lymphadenopathy, rashes or skin changes.     Exam above.  Patient in no acute distress.  Oropharynx is nonerythematous without exudates.  No tenderness to palpation or palpable masses of neck.  No lymphadenopathy present.    Head and neck soft tissue x-rays obtained personally reviewed revealing:Moderate degenerative disc disease at C5-C6. Mild to moderate degenerative disc disease at C6-C7. Background of mild degenerative change.     Patient is able to swallow and manage secretions at this time.  We did discuss possible pill esophagitis vs neck soft tissue infection.    Plan treatment with Augmentin and prednisone today if symptoms are not improving over the next 3 to 4 days or if patient is unable to manage oral secretions he should follow-up in ER.      Prior to making a final disposition on this patient the results of patient's tests and other diagnostic studies were discussed with the patient. All questions were answered. Patient expressed understanding of the plan and was amenable to it.     Disclaimer: This note consists of symbols derived from keyboarding, dictation and/or voice recognition software. As a result, there may be errors in the script that have gone undetected. Please consider this when interpreting information found in this chart.      Discharge Medication List as of 3/11/2025  2:19 PM        START taking these medications    Details   amoxicillin-clavulanate (AUGMENTIN) 875-125 MG tablet Take 1 tablet by mouth 2 times daily for 10 days., Disp-20 tablet, R-0, E-Prescribe      predniSONE (DELTASONE) 20 MG tablet Take two tablets (= 40mg) each day for 5 (five) days, Disp-10 tablet, R-0, E-Prescribe             Final diagnoses:   Globus sensation       3/11/2025   [facility] EMERGENCY DEPT        Antonia Luciano PA-C  03/15/25 [id number]

## 2025-03-17 ENCOUNTER — MYC MEDICAL ADVICE (OUTPATIENT)
Dept: FAMILY MEDICINE | Facility: CLINIC | Age: 55
End: 2025-03-17
Payer: COMMERCIAL

## 2025-03-17 ENCOUNTER — MYC REFILL (OUTPATIENT)
Dept: FAMILY MEDICINE | Facility: CLINIC | Age: 55
End: 2025-03-17
Payer: COMMERCIAL

## 2025-03-17 DIAGNOSIS — R80.9 TYPE 2 DIABETES MELLITUS WITH MICROALBUMINURIA, WITHOUT LONG-TERM CURRENT USE OF INSULIN (H): ICD-10-CM

## 2025-03-17 DIAGNOSIS — E11.29 TYPE 2 DIABETES MELLITUS WITH MICROALBUMINURIA, WITHOUT LONG-TERM CURRENT USE OF INSULIN (H): ICD-10-CM

## 2025-03-17 RX ORDER — METFORMIN HYDROCHLORIDE 500 MG/1
1000 TABLET, EXTENDED RELEASE ORAL 2 TIMES DAILY
Qty: 360 TABLET | Refills: 0 | Status: SHIPPED | OUTPATIENT
Start: 2025-03-17

## 2025-03-17 NOTE — TELEPHONE ENCOUNTER
GFR Estimate   Date Value Ref Range Status   12/26/2024 >90 >60 mL/min/1.73m2 Final     Comment:     eGFR calculated using 2021 CKD-EPI equation.   04/05/2021 >60 >60 mL/min/1.73m2 Final

## 2025-03-31 ENCOUNTER — MYC REFILL (OUTPATIENT)
Dept: FAMILY MEDICINE | Facility: CLINIC | Age: 55
End: 2025-03-31
Payer: COMMERCIAL

## 2025-03-31 DIAGNOSIS — E11.9 TYPE 2 DIABETES MELLITUS WITHOUT COMPLICATION, WITHOUT LONG-TERM CURRENT USE OF INSULIN (H): ICD-10-CM

## 2025-04-27 ENCOUNTER — HEALTH MAINTENANCE LETTER (OUTPATIENT)
Age: 55
End: 2025-04-27

## 2025-06-17 NOTE — PROCEDURES
2025     Enoch Scherer (:  1981) is a 43 y.o. male, here for evaluation of the following medical concerns:    HPI  History of Present Illness  The patient presents for evaluation of arm numbness, depression, and blood pressure management.    He experienced an episode of arm numbness, which led to a hospital visit due to concerns about a potential blood clot. The hospital ruled out a blood clot and provided a referral, which he has since misplaced. He is seeking a second opinion as the numbness occurs 2 to 3 times daily, characterized by a sensation of heaviness and tingling, akin to cutting off circulation. This symptom has been present for approximately 2 to 3 months and is not associated with any specific physical activity or injury. His occupation involves extensive computer use. The numbness appears to be more prevalent in the evenings and at night, and also when driving. He reports no neck pain. He has attempted to manage the symptoms with Advil and Tylenol, and notes that physical activity seems to alleviate the numbness.    He has been on Celexa for depression for approximately 16 years, which he reports as effective. A previous attempt to switch to Prozac resulted in adverse effects.    He was recently prescribed atenolol for blood pressure management by Dr. Duffy. He reports no palpitations but does experience anxiety and nervousness during medical visits.    He quit smoking cigarettes in October of last year. He had an alcohol problem in the past but currently drinks occasionally.    PAST SURGICAL HISTORY:  Plastic surgery on his face when he was little.    SOCIAL HISTORY  The patient quit smoking cigarettes in October of last year. He had an alcohol problem in the past but currently drinks occasionally.    FAMILY HISTORY  The patient reports cancer on his mother's side, heart disease on his father's side, and his mother has multiple sclerosis.     Today he denied chest pain,  EXCISION PROCEDURE NOTE    Name: Peter DUNCAN [facility]: [unfilled]   : 1970, 53 year old Room/Bed: Room/bed info not found   CSN: [id number] Admission: No admission date for patient encounter.   MRN: [id number] Today: 10/7/2023,     PCP: Papo Duron Attending: No att. providers found       PROCEDURE:   1. Excision of left chest soft tissue mass 4.7x2x2.5cm  2. Intermediate skin closure 4.7cm    INDICATION: enlarging soft tissue mass    PRE-PROCEDURE     : Mylene Crain MD  CONSENT: signed an Informed Consent Document.      PROCEDURE     The left chest area was prepped and appropriately anesthetized with 1% lidocaine with epinephrine. Using the usual technique, full thickness elliptical excision in total and with layered closure was performed.  Utilizing a #15 blade scalpel, an incision was made over the area of the palpable mass.  Adsons forceps were used to elevate the skin edge and blunt dissection utilizing a curved hemostat was done to separate the mass from the deep dermis. Hemostasis was achieved by holding pressure and injecting more local anesthetic with epinephrine, copious irrigation was then used to clean the wound. Hemostasis was achieved.  The total area excised, including lesion was 4.7x2x2.5 cm.  Intermediate closure was accomplished with interrupted 3-0 monocryl for the deep subcutaneous layer and running subcuticular 4-0 monocryl for the skin for total of 4.7cm.  Incision was covered with dermabond.  The procedure was well tolerated and without complications. Specimen was sent to Pathology      POST-PROCEDURE     The patient tolerated the procedure well    COMPLICATIONS: none    Plan:  1. Instructed to keep the wound dry and covered for 24-48h and clean thereafter.  2. Warning signs of infection were reviewed.    3. Recommended that the patient use OTC acetaminophen, OTC ibuprofen as needed for pain.         Electronically signed by: Mylene Crain MD; 10/7/2023

## 2025-07-26 DIAGNOSIS — I10 ESSENTIAL HYPERTENSION: ICD-10-CM

## 2025-07-28 RX ORDER — LOSARTAN POTASSIUM 50 MG/1
50 TABLET ORAL DAILY
Qty: 90 TABLET | Refills: 1 | Status: SHIPPED | OUTPATIENT
Start: 2025-07-28

## 2025-08-10 ENCOUNTER — HEALTH MAINTENANCE LETTER (OUTPATIENT)
Age: 55
End: 2025-08-10